# Patient Record
Sex: FEMALE | Race: WHITE | NOT HISPANIC OR LATINO | Employment: FULL TIME | URBAN - METROPOLITAN AREA
[De-identification: names, ages, dates, MRNs, and addresses within clinical notes are randomized per-mention and may not be internally consistent; named-entity substitution may affect disease eponyms.]

---
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
## 2018-07-05 NOTE — NON-PROVIDER
Vanessa Chávez is a 17 y.o. female here for a non-provider visit for PPD placement -- Step 2 of 2    Reason for PPD:  school requirement    1. TB evaluation questionnaire completed by patient? Yes      -  If any answers marked yes did you contact a provider prior to placing? Not Indicated  2.  Patient notified to return to clinic for reading on: 12/01/-12/02/2017  3.  PPD Placement documentation completed on TB evaluation questionnaire? Yes  4.  Location of TB evaluation questionnaire filed: front office folder     Statement Selected

## 2018-08-17 ENCOUNTER — NON-PROVIDER VISIT (OUTPATIENT)
Dept: OBGYN | Facility: CLINIC | Age: 18
End: 2018-08-17

## 2018-08-17 DIAGNOSIS — Z32.00 ENCOUNTER FOR PREGNANCY TEST, RESULT UNKNOWN: ICD-10-CM

## 2018-08-17 LAB
INT CON NEG: NEGATIVE
INT CON POS: POSITIVE
POC URINE PREGNANCY TEST: POSITIVE

## 2018-08-17 PROCEDURE — 81025 URINE PREGNANCY TEST: CPT | Performed by: OBSTETRICS & GYNECOLOGY

## 2018-10-03 ENCOUNTER — GYNECOLOGY VISIT (OUTPATIENT)
Dept: OBGYN | Facility: CLINIC | Age: 18
End: 2018-10-03
Payer: COMMERCIAL

## 2018-10-03 VITALS
BODY MASS INDEX: 23.62 KG/M2 | SYSTOLIC BLOOD PRESSURE: 110 MMHG | DIASTOLIC BLOOD PRESSURE: 72 MMHG | WEIGHT: 165 LBS | HEIGHT: 70 IN

## 2018-10-03 DIAGNOSIS — N93.8 DUB (DYSFUNCTIONAL UTERINE BLEEDING): ICD-10-CM

## 2018-10-03 PROCEDURE — 76805 OB US >/= 14 WKS SNGL FETUS: CPT | Performed by: OBSTETRICS & GYNECOLOGY

## 2018-10-03 PROCEDURE — 99213 OFFICE O/P EST LOW 20 MIN: CPT | Performed by: OBSTETRICS & GYNECOLOGY

## 2018-10-03 NOTE — PROGRESS NOTES
"Vanessa Chávez,  18 y.o.  female presents today with a C/O of :oligomenorrhea. Pt   No LMP recorded (lmp unknown).       Subjective : Nausea/Vomiting: No:  Abdominal /pelvic cramping : No :   vaginal bleeding:No      Menstrual Flow : moderate   GYN ROS:  normal menses, no abnormal bleeding, pelvic pain or discharge, no breast pain or new or enlarging lumps on self exam      Past Medical History:   Diagnosis Date   • Amenorrhea 2014   • Tourette's disorder 2012       No past surgical history on file.    Current Birth control:  none    OB History    Para Term  AB Living   0 0           SAB TAB Ectopic Molar Multiple Live Births                               Allergy:      Patient has no known allergies.    Exam;    /72 (BP Location: Right arm, Patient Position: Sitting)   Ht 1.778 m (5' 10\")   Wt 74.8 kg (165 lb)   LMP  (LMP Unknown)   BMI 23.68 kg/m²   well-appearing, well-hydrated, well-nourished, in no apparent distress, pleasant and verbal  normal;   PERRLA, EOMI, fundi grossly normal, no papilledema, no AV nicking, sclera clear  Clear to auscultation  RRR No M  abdomen is soft without significant tenderness, masses, organomegaly or guarding  FH 26cm   Pelvic : Deferred  Lab.    No results found for this or any previous visit (from the past 336 hour(s)).  Ultrasound :     Per my Read   Transabdominal     Second/third trimester findings: BPD: 6.83 cm, Placenta localization: anterior, Fetal presentation: transverse, US MIGDALIA: 2019 and BPD/HC/AC/FL c/w [id number]   MIGDALIA scan 2019   Positive cardiac / Fetal   Likely male       Assessment:    26w 3 day IUP   Late entry       Plan:  2 weeks for NOB       "

## 2018-10-15 ENCOUNTER — INITIAL PRENATAL (OUTPATIENT)
Dept: OBGYN | Facility: CLINIC | Age: 18
End: 2018-10-15
Payer: COMMERCIAL

## 2018-10-15 ENCOUNTER — HOSPITAL ENCOUNTER (OUTPATIENT)
Facility: MEDICAL CENTER | Age: 18
End: 2018-10-15
Attending: OBSTETRICS & GYNECOLOGY
Payer: COMMERCIAL

## 2018-10-15 ENCOUNTER — HOSPITAL ENCOUNTER (OUTPATIENT)
Dept: LAB | Facility: MEDICAL CENTER | Age: 18
End: 2018-10-15
Attending: OBSTETRICS & GYNECOLOGY
Payer: COMMERCIAL

## 2018-10-15 VITALS — BODY MASS INDEX: 24.25 KG/M2 | WEIGHT: 169 LBS | DIASTOLIC BLOOD PRESSURE: 70 MMHG | SYSTOLIC BLOOD PRESSURE: 114 MMHG

## 2018-10-15 DIAGNOSIS — Z34.03 ENCOUNTER FOR SUPERVISION OF NORMAL FIRST PREGNANCY, THIRD TRIMESTER: ICD-10-CM

## 2018-10-15 DIAGNOSIS — B37.31 VULVOVAGINAL CANDIDIASIS: ICD-10-CM

## 2018-10-15 DIAGNOSIS — Z36.3 SCREENING, ANTENATAL, FOR MALFORMATION BY ULTRASOUND: ICD-10-CM

## 2018-10-15 PROBLEM — O03.9 COMPLETE ABORTION: Status: RESOLVED | Noted: 2017-11-22 | Resolved: 2018-10-15

## 2018-10-15 LAB
ABO GROUP BLD: NORMAL
BACTERIA #/AREA URNS HPF: ABNORMAL /HPF
BLD GP AB SCN SERPL QL: NORMAL
EPI CELLS #/AREA URNS HPF: ABNORMAL /HPF
HYALINE CASTS #/AREA URNS LPF: ABNORMAL /LPF
RBC # URNS HPF: ABNORMAL /HPF
RH BLD: NORMAL
WBC #/AREA URNS HPF: ABNORMAL /HPF

## 2018-10-15 PROCEDURE — 81001 URINALYSIS AUTO W/SCOPE: CPT

## 2018-10-15 PROCEDURE — 85025 COMPLETE CBC W/AUTO DIFF WBC: CPT

## 2018-10-15 PROCEDURE — 86901 BLOOD TYPING SEROLOGIC RH(D): CPT

## 2018-10-15 PROCEDURE — 86780 TREPONEMA PALLIDUM: CPT

## 2018-10-15 PROCEDURE — 87591 N.GONORRHOEAE DNA AMP PROB: CPT | Mod: 91

## 2018-10-15 PROCEDURE — 86900 BLOOD TYPING SEROLOGIC ABO: CPT

## 2018-10-15 PROCEDURE — 87591 N.GONORRHOEAE DNA AMP PROB: CPT

## 2018-10-15 PROCEDURE — 36415 COLL VENOUS BLD VENIPUNCTURE: CPT

## 2018-10-15 PROCEDURE — 82950 GLUCOSE TEST: CPT

## 2018-10-15 PROCEDURE — 87340 HEPATITIS B SURFACE AG IA: CPT

## 2018-10-15 PROCEDURE — 87491 CHLMYD TRACH DNA AMP PROBE: CPT | Mod: 91

## 2018-10-15 PROCEDURE — 86850 RBC ANTIBODY SCREEN: CPT

## 2018-10-15 PROCEDURE — 90686 IIV4 VACC NO PRSV 0.5 ML IM: CPT | Performed by: OBSTETRICS & GYNECOLOGY

## 2018-10-15 PROCEDURE — 86762 RUBELLA ANTIBODY: CPT

## 2018-10-15 PROCEDURE — 87086 URINE CULTURE/COLONY COUNT: CPT

## 2018-10-15 PROCEDURE — 87389 HIV-1 AG W/HIV-1&-2 AB AG IA: CPT

## 2018-10-15 PROCEDURE — 90471 IMMUNIZATION ADMIN: CPT | Performed by: OBSTETRICS & GYNECOLOGY

## 2018-10-15 PROCEDURE — 87491 CHLMYD TRACH DNA AMP PROBE: CPT

## 2018-10-15 PROCEDURE — 90040 PR PRENATAL FOLLOW UP: CPT | Performed by: OBSTETRICS & GYNECOLOGY

## 2018-10-15 RX ORDER — FLUCONAZOLE 150 MG/1
150 TABLET ORAL ONCE
Qty: 1 TAB | Refills: 0 | Status: SHIPPED | OUTPATIENT
Start: 2018-10-15 | End: 2018-10-31 | Stop reason: SDUPTHER

## 2018-10-15 NOTE — LETTER
"Count Your Baby's Movements  Another step to a healthy delivery        How Many Weeks Pregnant? 28 WEEKS 1 DAY  Date to Begin Counting: 10/15/18              How to use this chart    One way for your physician to keep track of your baby's health is by knowing how often the baby moves (or \"kicks\") in your womb.  You can help your physician to do this by using this chart every day.    Every day, you should see how many hours it takes for your baby to move 10 times.  Start in the morning, as soon as you get up.    · First, write down the time your baby moves until you get to 10.  · Check off one box every time your baby moves until you get to 10.  · Write down the time you finished counting in the last column.  · Total how long it took to count up all 10 movements.  · Finally, fill in the box that shows how long this took.  After counting 10 movements, you no longer have to count any more that day.  The next morning, just start counting again as soon as you get up.    What should you call a \"movement\"?  It is hard to say, because it will feel different from one mother to another and from one pregnancy to the next.  The important thing is that you count the movements the same way throughout your pregnancy.  If you have more questions, you should ask your physician.    Count carefully every day!  SAMPLE:  Week 28    How many hours did it take to feel 10 movements?       Start  Time     1     2     3     4     5     6     7     8     9     10   Finish Time   Mon 8:20 ·  ·  ·  ·  ·  ·  ·  ·  ·  ·  11:40   Tue Wed Thu Fri               Sat               Sun                 IMPORTANT: You should contact your physician if it takes more than two hours for you to feel 10 movements.  Each morning, write down the time and start to count the movements of your baby.  Keep track by checking off one box every time you feel one movement.  When you have felt 10 \"kicks\", write down the time you " finished counting in the last column.  Then fill in the   box (over the check roni) for the number of hours it took.  Be sure to read the complete instructions on the previous page.

## 2018-10-15 NOTE — PROGRESS NOTES
Cc: New OB visit    HPI:  The patient is a 18 y.o.  28w1d based upon 26wk u/s.  No LMP recorded (lmp unknown). Patient is pregnant.    She presents for her new obstetric visit.    She reports fetal movement, denies vaginal bleeding, denies leakage of fluid, denies contractions.     She denies nausea/vomiting, headaches, or urinary symptoms.      OB History    Para Term  AB Living   2 0     1     SAB TAB Ectopic Molar Multiple Live Births   1                # Outcome Date GA Lbr Nakul/2nd Weight Sex Delivery Anes PTL Lv   2 Current            1 SAB 10/01/17     SAB           Past Medical History:   Diagnosis Date   • Amenorrhea 2014   • Tourette's disorder 2012     History reviewed. No pertinent surgical history.  Social History     Social History   • Marital status: Single     Spouse name: N/A   • Number of children: N/A   • Years of education: N/A     Occupational History   • Not on file.     Social History Main Topics   • Smoking status: Never Smoker   • Smokeless tobacco: Never Used   • Alcohol use No   • Drug use: No   • Sexual activity: Yes     Partners: Male     Birth control/ protection: Condom     Other Topics Concern   • Not on file     Social History Narrative    Lives with Mother, Mom's boyfriend, Older brother, two other younger children (not-related)    + Tob exposure    Pet - 4 dogs, 2 birds, fish, hamster    Starting as Freshman at [employer].     Family History   Problem Relation Age of Onset   • Other Mother         IPT   • Anemia Mother    • Psychiatry Brother         tourette movement d/o - resolved   • Arthritis Maternal Grandmother    • Diabetes Maternal Grandmother    • Diabetes Maternal Uncle         T2DM     Allergies:   Allergies as of 10/15/2018   • (No Known Allergies)       PE:    Blood pressure 114/70, weight 76.7 kg (169 lb), not currently breastfeeding.    General: no apparent distress, is well developed and well nourished  Head: normocephalic, atraumatic  Neck:  neck is supple, non-tender, no thyromegaly, full range of motion  CVS: regular rate and rhythm, no peripheral edema  Lungs: Normal respiratory effort. Clear to auscultation bilaterally  Abdomen: Bowel sounds positive, nondistended, soft, nontender x4, no rebound or guarding. Fundus at 28cm  Female GYN: normal female external genitalia without lesions, BUS WNL, thick cheesy white discharge noted in vaginal vault, cervix normal w/out lesions  Skin: No rashes, or ulcers or lesions seen  Psychiatric: Patient shows appropriate affect, is alert and oriented x3, intact judgment and insight.        A/P:  18 y.o.  28w1d based upon  No LMP recorded (lmp unknown). Patient is pregnant..  She is here for her new obstetric visit.    1. Encounter for supervision of normal first pregnancy, third trimester  PRENATAL PANEL 3+HIV+UACXI    GLUCOSE 1HR GESTATIONAL    CHLAMYDIA/GC PCR URINE OR SWAB   2. Screening, , for malformation by ultrasound  US-OB 2ND 3RD TRI COMPLETE       1. Ultrasound for dates and anatomy ordered.  2. Recommend prenatal classes, breast feeding classes  3.  Ordered prenatal labs & 1hr GTT.  4.  NOB packet given with ACOG prenatal book.  5.  Increase water intake and encouraged healthy nutrition.  6.  Rx of diflucan for tx of yeast infection.  7.  Continue prenatal vitamins.  8.  Follow-up in 2 weeks.   9.  Labor precautions & fetal kick counts educated.

## 2018-10-15 NOTE — NON-PROVIDER
Flu vaccine given today, Right deltoid. Screening checklist reviewed with patient. Verified by [facility]

## 2018-10-16 LAB
APPEARANCE UR: CLEAR
BASOPHILS # BLD AUTO: 0.3 % (ref 0–1.8)
BASOPHILS # BLD: 0.04 K/UL (ref 0–0.12)
BILIRUB UR QL STRIP.AUTO: NEGATIVE
C TRACH DNA SPEC QL NAA+PROBE: NEGATIVE
C TRACH DNA SPEC QL NAA+PROBE: NEGATIVE
COLOR UR: YELLOW
EOSINOPHIL # BLD AUTO: 0.08 K/UL (ref 0–0.51)
EOSINOPHIL NFR BLD: 0.6 % (ref 0–6.9)
ERYTHROCYTE [DISTWIDTH] IN BLOOD BY AUTOMATED COUNT: 42.3 FL (ref 35.9–50)
GLUCOSE 1H P 50 G GLC PO SERPL-MCNC: 88 MG/DL (ref 70–139)
GLUCOSE UR STRIP.AUTO-MCNC: NEGATIVE MG/DL
HBV SURFACE AG SER QL: NEGATIVE
HCT VFR BLD AUTO: 34.2 % (ref 37–47)
HGB BLD-MCNC: 11.3 G/DL (ref 12–16)
HIV 1+2 AB+HIV1 P24 AG SERPL QL IA: NON REACTIVE
IMM GRANULOCYTES # BLD AUTO: 0.32 K/UL (ref 0–0.11)
IMM GRANULOCYTES NFR BLD AUTO: 2.3 % (ref 0–0.9)
KETONES UR STRIP.AUTO-MCNC: NEGATIVE MG/DL
LEUKOCYTE ESTERASE UR QL STRIP.AUTO: ABNORMAL
LYMPHOCYTES # BLD AUTO: 1.86 K/UL (ref 1–4.8)
LYMPHOCYTES NFR BLD: 13.5 % (ref 22–41)
MCH RBC QN AUTO: 29.9 PG (ref 27–33)
MCHC RBC AUTO-ENTMCNC: 33 G/DL (ref 33.6–35)
MCV RBC AUTO: 90.5 FL (ref 81.4–97.8)
MICRO URNS: ABNORMAL
MONOCYTES # BLD AUTO: 0.9 K/UL (ref 0–0.85)
MONOCYTES NFR BLD AUTO: 6.5 % (ref 0–13.4)
N GONORRHOEA DNA SPEC QL NAA+PROBE: NEGATIVE
N GONORRHOEA DNA SPEC QL NAA+PROBE: NEGATIVE
NEUTROPHILS # BLD AUTO: 10.56 K/UL (ref 2–7.15)
NEUTROPHILS NFR BLD: 76.8 % (ref 44–72)
NITRITE UR QL STRIP.AUTO: NEGATIVE
NRBC # BLD AUTO: 0 K/UL
NRBC BLD-RTO: 0 /100 WBC
PH UR STRIP.AUTO: 6.5 [PH]
PLATELET # BLD AUTO: 215 K/UL (ref 164–446)
PMV BLD AUTO: 10.6 FL (ref 9–12.9)
PROT UR QL STRIP: NEGATIVE MG/DL
RBC # BLD AUTO: 3.78 M/UL (ref 4.2–5.4)
RBC UR QL AUTO: NEGATIVE
RUBV AB SER QL: 31.2 IU/ML
SP GR UR STRIP.AUTO: 1.01
SPECIMEN SOURCE: NORMAL
SPECIMEN SOURCE: NORMAL
TREPONEMA PALLIDUM IGG+IGM AB [PRESENCE] IN SERUM OR PLASMA BY IMMUNOASSAY: NON REACTIVE
UROBILINOGEN UR STRIP.AUTO-MCNC: 0.2 MG/DL
WBC # BLD AUTO: 13.8 K/UL (ref 4.8–10.8)

## 2018-10-18 LAB
BACTERIA UR CULT: NORMAL
SIGNIFICANT IND 70042: NORMAL
SITE SITE: NORMAL
SOURCE SOURCE: NORMAL

## 2018-10-25 ENCOUNTER — HOSPITAL ENCOUNTER (OUTPATIENT)
Dept: RADIOLOGY | Facility: MEDICAL CENTER | Age: 18
End: 2018-10-25
Attending: OBSTETRICS & GYNECOLOGY
Payer: COMMERCIAL

## 2018-10-25 DIAGNOSIS — Z36.3 SCREENING, ANTENATAL, FOR MALFORMATION BY ULTRASOUND: ICD-10-CM

## 2018-10-25 PROCEDURE — 76805 OB US >/= 14 WKS SNGL FETUS: CPT

## 2018-10-30 ENCOUNTER — DATING (OUTPATIENT)
Dept: OBGYN | Facility: CLINIC | Age: 18
End: 2018-10-30

## 2018-10-31 ENCOUNTER — ROUTINE PRENATAL (OUTPATIENT)
Dept: OBGYN | Facility: CLINIC | Age: 18
End: 2018-10-31
Payer: COMMERCIAL

## 2018-10-31 VITALS — DIASTOLIC BLOOD PRESSURE: 68 MMHG | SYSTOLIC BLOOD PRESSURE: 112 MMHG | WEIGHT: 170 LBS | BODY MASS INDEX: 24.39 KG/M2

## 2018-10-31 DIAGNOSIS — F32.A ANXIETY AND DEPRESSION: ICD-10-CM

## 2018-10-31 DIAGNOSIS — Z34.83 PRENATAL CARE, SUBSEQUENT PREGNANCY IN THIRD TRIMESTER: ICD-10-CM

## 2018-10-31 DIAGNOSIS — F41.9 ANXIETY AND DEPRESSION: ICD-10-CM

## 2018-10-31 DIAGNOSIS — F95.8 MOTOR TIC DISORDER: ICD-10-CM

## 2018-10-31 PROCEDURE — 90040 PR PRENATAL FOLLOW UP: CPT | Performed by: OBSTETRICS & GYNECOLOGY

## 2018-10-31 RX ORDER — FLUCONAZOLE 150 MG/1
150 TABLET ORAL ONCE
Qty: 1 TAB | Refills: 0 | Status: SHIPPED | OUTPATIENT
Start: 2018-10-31 | End: 2018-10-31

## 2018-10-31 NOTE — PROGRESS NOTES
S: Pt presents for routine OB follow up. Reports Good fetal movement/normal kick counts.  No contractions, vaginal bleeding, or leakage of fluid.    Questions answered.    O: /68   Wt 77.1 kg (170 lb)   LMP  (LMP Unknown)   BMI 24.39 kg/m²   Patients' weight gain, fluid intake and exercise level discussed.  Vitals, fundal height , fetal position, and FHR reviewed on flowsheet    Lab:No results found for this or any previous visit (from the past 336 hour(s)).    A/P:  18 y.o.  at 30w3d presents for routine obstetric follow-up.  Size equals dates  Yeast vaginitis-RX Diflucan    1.  Continue prenatal vitamins.  2.  Continue daily Fetal kick counts.  3.  Exercise at least 30 minutes daily.  4.  Increase water intake.  5.  Pregnancy and  Labor precautions educated.  6.  Follow-up in 2 weeks.  7.  GBS @ 35 wks discussed

## 2018-11-12 ENCOUNTER — ROUTINE PRENATAL (OUTPATIENT)
Dept: OBGYN | Facility: CLINIC | Age: 18
End: 2018-11-12
Payer: COMMERCIAL

## 2018-11-12 VITALS — WEIGHT: 175.2 LBS | DIASTOLIC BLOOD PRESSURE: 60 MMHG | SYSTOLIC BLOOD PRESSURE: 110 MMHG | BODY MASS INDEX: 25.14 KG/M2

## 2018-11-12 DIAGNOSIS — Z3A.32 32 WEEKS GESTATION OF PREGNANCY: ICD-10-CM

## 2018-11-12 PROCEDURE — 90471 IMMUNIZATION ADMIN: CPT | Performed by: OBSTETRICS & GYNECOLOGY

## 2018-11-12 PROCEDURE — 90040 PR PRENATAL FOLLOW UP: CPT | Performed by: OBSTETRICS & GYNECOLOGY

## 2018-11-12 PROCEDURE — 90715 TDAP VACCINE 7 YRS/> IM: CPT | Performed by: OBSTETRICS & GYNECOLOGY

## 2018-11-12 NOTE — PROGRESS NOTES
Patient here today for OB follow up @ [id number]  States good FM, denies VB and LOF.  C/O:Lump on back, pain w palpitation x2wks  Pharm verified  Good # [phone]    Tdap vaccine given today, Right deltoid. Screening checklist reviewed with pt

## 2018-11-12 NOTE — PROGRESS NOTES
EMPERATRIZ:  32w1d    Pt reports doing well.  Denies vaginal bleeding, contractions, LOF.  Reports +FM.    LMP  (LMP Unknown)   gen: AAO, NAD  FHTs: 150s  FH: 32    A/P: 18 y.o.  @ 32w1d   #Prenatal care. PNL up to date, Rh+, glucola/3rd tri CBC.  RPR WNL; s/p flu vaccine but has not had tdap - given today  # FWB.  anatomy US wnl, FH appropriate, FHT + today, continue kick counts  # Labor precautions discussed  #Delivery plan: await spontaneous labor  #Postpartum planning.  plans to BF, PPBCM discussed today - interested in Nexplanon  #RTC 2wks

## 2018-11-26 ENCOUNTER — HOSPITAL ENCOUNTER (OUTPATIENT)
Facility: MEDICAL CENTER | Age: 18
End: 2018-11-26
Attending: OBSTETRICS & GYNECOLOGY | Admitting: OBSTETRICS & GYNECOLOGY
Payer: COMMERCIAL

## 2018-11-26 VITALS
SYSTOLIC BLOOD PRESSURE: 111 MMHG | TEMPERATURE: 98.2 F | DIASTOLIC BLOOD PRESSURE: 65 MMHG | OXYGEN SATURATION: 98 % | HEART RATE: 122 BPM

## 2018-11-26 PROCEDURE — 59025 FETAL NON-STRESS TEST: CPT

## 2018-11-26 ASSESSMENT — PAIN SCALES - GENERAL: PAINLEVEL_OUTOF10: 0

## 2018-11-26 NOTE — PROGRESS NOTES
0232- Pt presented to labor unit with complaints of SOB upon waking this morning. States she couldn't breath for 20secs. Denies UC's, VB, LOF. Abd soft and non tender to touch. Confirms fetal movements. EFM and TOCO applied. FOB at bedside offering emotional support.  Pulse Ox applied. VSS, O2 sat 97%. Pt states she has a history of panic attacks. POC discussed. Pt verbalized understanding.     0240- Category one tracing noted.     0255- Dr. Cobos notified of pt status. Orders received to discharge to ER after 30 minute NST.     0300- Discharge instructions given and explained. Pt verbalized understanding.     0316- Pt discharged to emergency room with FOB undelivered.

## 2018-11-29 ENCOUNTER — ROUTINE PRENATAL (OUTPATIENT)
Dept: OBGYN | Facility: CLINIC | Age: 18
End: 2018-11-29
Payer: COMMERCIAL

## 2018-11-29 VITALS — BODY MASS INDEX: 25.54 KG/M2 | WEIGHT: 178 LBS | SYSTOLIC BLOOD PRESSURE: 110 MMHG | DIASTOLIC BLOOD PRESSURE: 62 MMHG

## 2018-11-29 DIAGNOSIS — Z34.83 ENCOUNTER FOR SUPERVISION OF OTHER NORMAL PREGNANCY, THIRD TRIMESTER: ICD-10-CM

## 2018-11-29 PROCEDURE — 90040 PR PRENATAL FOLLOW UP: CPT | Performed by: OBSTETRICS & GYNECOLOGY

## 2018-11-29 NOTE — PROGRESS NOTES
Pt here today for OB follow up @ 34w4d  Pt Denies any leaking fluids or vaginal blood  Reports +FM  Good # [phone]  Pharmacy Confirmed.

## 2018-11-29 NOTE — PROGRESS NOTES
S: Pt presents for routine OB follow up. Reports good fetal movements/normal kick counts.  No contractions, vaginal bleeding, or leakage of fluid.    Questions answered.    O: /62   Wt 80.7 kg (178 lb)   LMP  (LMP Unknown)   BMI 25.54 kg/m²   Patients' weight gain, fluid intake and exercise level discussed.  Vitals, fundal height , fetal position, and FHR reviewed on flowsheet    Lab:No results found for this or any previous visit (from the past 336 hour(s)).    A/P:  18 y.o.  at 34w4d presents for routine obstetric follow-up.  Size equals dates     1.  Continue prenatal vitamins.  2.  Continue daily Fetal kick counts.  3.  Exercise at least 30 minutes daily.  4.  Drink at least 2L of water daily  5. Pregnancy and   Labor precautions reviewed  6.  Follow-up in 1 week.  7.  GBS at f/u visit discussed

## 2018-12-10 ENCOUNTER — HOSPITAL ENCOUNTER (OUTPATIENT)
Facility: MEDICAL CENTER | Age: 18
End: 2018-12-10
Attending: OBSTETRICS & GYNECOLOGY
Payer: COMMERCIAL

## 2018-12-10 ENCOUNTER — ROUTINE PRENATAL (OUTPATIENT)
Dept: OBGYN | Facility: CLINIC | Age: 18
End: 2018-12-10
Payer: COMMERCIAL

## 2018-12-10 VITALS — WEIGHT: 179 LBS | DIASTOLIC BLOOD PRESSURE: 68 MMHG | BODY MASS INDEX: 25.68 KG/M2 | SYSTOLIC BLOOD PRESSURE: 118 MMHG

## 2018-12-10 DIAGNOSIS — Z34.83 ENCOUNTER FOR SUPERVISION OF OTHER NORMAL PREGNANCY IN THIRD TRIMESTER: ICD-10-CM

## 2018-12-10 PROBLEM — Z34.90 SUPERVISION OF NORMAL PREGNANCY: Status: ACTIVE | Noted: 2018-12-10

## 2018-12-10 PROCEDURE — 87081 CULTURE SCREEN ONLY: CPT

## 2018-12-10 PROCEDURE — 87150 DNA/RNA AMPLIFIED PROBE: CPT

## 2018-12-10 PROCEDURE — 90040 PR PRENATAL FOLLOW UP: CPT | Performed by: OBSTETRICS & GYNECOLOGY

## 2018-12-10 NOTE — PROGRESS NOTES
S: Pt presents for routine OB follow up. Reports Good fetal movements/ normal kick counts.  No contractions, vaginal bleeding, or leakage of fluid.    Questions answered.    O: /68   Wt 81.2 kg (179 lb)   LMP  (LMP Unknown)   BMI 25.68 kg/m²   Patients' weight gain, fluid intake and exercise level discussed.  Vitals, fundal height , fetal position, and FHR reviewed on flowsheet    Lab:No results found for this or any previous visit (from the past 336 hour(s)).    A/P:  18 y.o.  at 36w1d presents for routine obstetric follow-up.  Size equals dates and/or scan    1.  Continue prenatal vitamins.  2.  Fetal kick counts.  3.  Exercise at least 30 minutes daily.  4.  Drink at least 2L of water daily  5.  Pregnancy and Labor precautions reviewed  6.  Follow-up in 1 week  7.  GBS done today  8.  Precautions and plan of care reviewed

## 2018-12-10 NOTE — PROGRESS NOTES
Pt here today for OB follow up  Pt Denies leaking fluids or vaginal pain  Reports +FM  Pharmacy Confirmed.

## 2018-12-11 LAB — GP B STREP DNA SPEC QL NAA+PROBE: POSITIVE

## 2018-12-12 ENCOUNTER — TELEPHONE (OUTPATIENT)
Dept: OBGYN | Facility: CLINIC | Age: 18
End: 2018-12-12

## 2018-12-12 DIAGNOSIS — O99.820 GBS (GROUP B STREPTOCOCCUS CARRIER), +RV CULTURE, CURRENTLY PREGNANT: ICD-10-CM

## 2018-12-13 NOTE — TELEPHONE ENCOUNTER
[facility] called to report + GBS, results printed and given to provider

## 2018-12-17 ENCOUNTER — HOSPITAL ENCOUNTER (OUTPATIENT)
Facility: MEDICAL CENTER | Age: 18
End: 2018-12-17
Attending: OBSTETRICS & GYNECOLOGY | Admitting: OBSTETRICS & GYNECOLOGY
Payer: COMMERCIAL

## 2018-12-17 VITALS — TEMPERATURE: 98.1 F | SYSTOLIC BLOOD PRESSURE: 109 MMHG | HEART RATE: 100 BPM | DIASTOLIC BLOOD PRESSURE: 52 MMHG

## 2018-12-17 LAB — CRYSTALS AMN MICRO: NORMAL

## 2018-12-17 PROCEDURE — 59025 FETAL NON-STRESS TEST: CPT

## 2018-12-17 PROCEDURE — 89060 EXAM SYNOVIAL FLUID CRYSTALS: CPT

## 2018-12-18 NOTE — PROGRESS NOTES
INTERPRETATION OF NST    INDICATIONS: IUP @ 37+1/7 wks, leaking of fluid.    [place] - intermittent contractions  EFM - 130s, moderate variability, + accels.  Cat 1 tracing.       Fern negative.  No evidence of rupture per nurse.   F/U TPC as scheduled.

## 2018-12-18 NOTE — PROGRESS NOTES
MIGDALIA / EGA 37.1    Positive FM, denies VB    PT present with CO leaking that started in AM, she denies wearing a pad.  She also stated she is having menstrual like cramps.    SVE 1/thick/high, Sterile specu;um placed, no pooling noted, FERN collected

## 2018-12-18 NOTE — PROGRESS NOTES
1900- Assumed care of pt, POC discussed. Awaiting FERN results. Category One fetal heart tracing noted. Pt verbalized understanding.     1935- Dr. Condon called with negative FERN result. Category one tracing noted. Orders received to discharge home.    1936- Discharge instructions given and explained. Pt and FOB verbalized understanding.     1940- Pt discharged home undelivered.

## 2018-12-20 ENCOUNTER — ROUTINE PRENATAL (OUTPATIENT)
Dept: OBGYN | Facility: CLINIC | Age: 18
End: 2018-12-20
Payer: COMMERCIAL

## 2018-12-20 VITALS — SYSTOLIC BLOOD PRESSURE: 116 MMHG | DIASTOLIC BLOOD PRESSURE: 68 MMHG | WEIGHT: 185 LBS | BODY MASS INDEX: 26.54 KG/M2

## 2018-12-20 DIAGNOSIS — Z34.03 ENCOUNTER FOR SUPERVISION OF NORMAL FIRST PREGNANCY IN THIRD TRIMESTER: ICD-10-CM

## 2018-12-20 PROCEDURE — 90040 PR PRENATAL FOLLOW UP: CPT | Performed by: OBSTETRICS & GYNECOLOGY

## 2018-12-20 NOTE — PROGRESS NOTES
Ob follow up. Good fetal movement. Pt has no complaints.   Phone # & pharmacy verified.  GBS = Positive

## 2018-12-20 NOTE — PROGRESS NOTES
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 Currently doing well . Good fetal movement no leakage of fluid no contractions or vaginal bleeding        Size equals dates, normal fetal heart rate      Labs; GBS positive    Labor precautions given  Increase oral hydration  Discussed proper weight gain during pregnancy  Continue prenatal vitamins  Increase water intake  Reviewed our group's policy on prenatal visits with all providers in the group    Followup in 1 weeks

## 2018-12-24 ENCOUNTER — ROUTINE PRENATAL (OUTPATIENT)
Dept: OBGYN | Facility: CLINIC | Age: 18
End: 2018-12-24
Payer: COMMERCIAL

## 2018-12-24 VITALS — WEIGHT: 184 LBS | SYSTOLIC BLOOD PRESSURE: 104 MMHG | BODY MASS INDEX: 26.4 KG/M2 | DIASTOLIC BLOOD PRESSURE: 66 MMHG

## 2018-12-24 DIAGNOSIS — F41.9 ANXIETY AND DEPRESSION: ICD-10-CM

## 2018-12-24 DIAGNOSIS — Z34.83 ENCOUNTER FOR SUPERVISION OF OTHER NORMAL PREGNANCY, THIRD TRIMESTER: ICD-10-CM

## 2018-12-24 DIAGNOSIS — F95.8 MOTOR TIC DISORDER: ICD-10-CM

## 2018-12-24 DIAGNOSIS — F32.A ANXIETY AND DEPRESSION: ICD-10-CM

## 2018-12-24 DIAGNOSIS — O99.820 GBS (GROUP B STREPTOCOCCUS CARRIER), +RV CULTURE, CURRENTLY PREGNANT: ICD-10-CM

## 2018-12-24 PROCEDURE — 90040 PR PRENATAL FOLLOW UP: CPT | Performed by: OBSTETRICS & GYNECOLOGY

## 2018-12-24 NOTE — PROGRESS NOTES
S: Pt presents for routine OB follow up.  Patient reports good fetal movements/normal Daily kick counts.  No contractions, vaginal bleeding, or leakage of fluid.    Questions answered.    O: /66   Wt 83.5 kg (184 lb)   LMP  (LMP Unknown)   BMI 26.40 kg/m²   Patients' weight gain, fluid intake and exercise level discussed.  Vitals, fundal height , fetal position, and FHR reviewed on flowsheet    Lab:  Recent Results (from the past 336 hour(s))   FERN TEST    Collection Time: 18  6:40 PM   Result Value Ref Range    Fern Test On Amniotic Fluid see below Not present       A/P:  18 y.o.  at 38w1d presents for routine obstetric follow-up.  Size equals dates   Positive GBS status discussed.  Treatment in labor reviewed    1.  Continue prenatal vitamins.  2.  Fetal kick counts daily discussed.  3.  Exercise at least 30 minutes daily.  4.  Drink at least 2L of water daily  5.  Pregnancy and labor precautions were reviewed  6.  Follow-up in 1 week.

## 2018-12-31 ENCOUNTER — ROUTINE PRENATAL (OUTPATIENT)
Dept: OBGYN | Facility: CLINIC | Age: 18
End: 2018-12-31
Payer: COMMERCIAL

## 2018-12-31 VITALS — DIASTOLIC BLOOD PRESSURE: 68 MMHG | WEIGHT: 184 LBS | BODY MASS INDEX: 26.4 KG/M2 | SYSTOLIC BLOOD PRESSURE: 110 MMHG

## 2018-12-31 DIAGNOSIS — F95.8 MOTOR TIC DISORDER: ICD-10-CM

## 2018-12-31 DIAGNOSIS — Z34.83 ENCOUNTER FOR SUPERVISION OF OTHER NORMAL PREGNANCY, THIRD TRIMESTER: ICD-10-CM

## 2018-12-31 DIAGNOSIS — F41.9 ANXIETY AND DEPRESSION: ICD-10-CM

## 2018-12-31 DIAGNOSIS — F32.A ANXIETY AND DEPRESSION: ICD-10-CM

## 2018-12-31 DIAGNOSIS — O99.820 GBS (GROUP B STREPTOCOCCUS CARRIER), +RV CULTURE, CURRENTLY PREGNANT: ICD-10-CM

## 2018-12-31 PROCEDURE — 90040 PR PRENATAL FOLLOW UP: CPT | Performed by: OBSTETRICS & GYNECOLOGY

## 2018-12-31 NOTE — PROGRESS NOTES
S: Pt presents for routine OB follow up.  Normal kick counts /good fetal movement.  Reports rare mild contractions.  Denies vaginal bleeding, or leakage of fluid.    Questions answered.    O: /68   Wt 83.5 kg (184 lb)   LMP  (LMP Unknown)   BMI 26.40 kg/m²   Patients' weight gain, fluid intake and exercise level discussed.  Vitals, fundal height , fetal position, and FHR reviewed on flowsheet    Lab:  Recent Results (from the past 336 hour(s))   FERN TEST    Collection Time: 18  6:40 PM   Result Value Ref Range    Fern Test On Amniotic Fluid see below Not present       A/P:  18 y.o.  at 39w1d presents for routine obstetric follow-up.  Size equals dates   GBS positive status discussed  We discussed option for induction of labor-patient desires to wait until next visit to schedule induction    1.  Continue prenatal vitamins.  2.  Fetal kick counts.  3.  Exercise at least 30 minutes daily.  4.  Drink at least 2L of water daily  5.  Pregnancy and labor precautions educated.  6.  Follow-up in 1 week.  7.  Plan of care was discussed

## 2019-01-09 ENCOUNTER — ROUTINE PRENATAL (OUTPATIENT)
Dept: OBGYN | Facility: CLINIC | Age: 19
End: 2019-01-09
Payer: COMMERCIAL

## 2019-01-09 VITALS — WEIGHT: 194 LBS | SYSTOLIC BLOOD PRESSURE: 112 MMHG | BODY MASS INDEX: 27.84 KG/M2 | DIASTOLIC BLOOD PRESSURE: 70 MMHG

## 2019-01-09 DIAGNOSIS — F41.9 ANXIETY AND DEPRESSION: ICD-10-CM

## 2019-01-09 DIAGNOSIS — Z34.03 ENCOUNTER FOR SUPERVISION OF NORMAL FIRST PREGNANCY IN THIRD TRIMESTER: ICD-10-CM

## 2019-01-09 DIAGNOSIS — Z34.90 TERM PREGNANCY: ICD-10-CM

## 2019-01-09 DIAGNOSIS — O99.820 GBS (GROUP B STREPTOCOCCUS CARRIER), +RV CULTURE, CURRENTLY PREGNANT: ICD-10-CM

## 2019-01-09 DIAGNOSIS — F32.A ANXIETY AND DEPRESSION: ICD-10-CM

## 2019-01-09 PROCEDURE — 90040 PR PRENATAL FOLLOW UP: CPT | Performed by: OBSTETRICS & GYNECOLOGY

## 2019-01-09 NOTE — PROGRESS NOTES
EMPERATRIZ:  40w3d    Pt reports doing well.  Denies vaginal bleeding, contractions, LOF.  Reports +FM.    LMP  (LMP Unknown)   gen: AAO, NAD  SVE: /-3  FHTs: 150  FH: 38    A/P: 18 y.o.  @ 40w3d   #Prenatal care. PNL up to date, Rh+, glucola/3rd tri CBC.  RPR WNL; s/p tdap and flu vaccines. GBS +  # FWB.  anatomy US wnl, FH appropriate, FHT + today, continue kick counts  # Labor precautions discussed  #Delivery plan: induction of labor requested  #Postpartum planning.  plans to , PPBCM - aware of options  #IOL within next few days - patient to be called by

## 2019-01-12 ENCOUNTER — APPOINTMENT (OUTPATIENT)
Dept: OBGYN | Facility: MEDICAL CENTER | Age: 19
End: 2019-01-12
Attending: OBSTETRICS & GYNECOLOGY
Payer: COMMERCIAL

## 2019-01-12 ENCOUNTER — HOSPITAL ENCOUNTER (INPATIENT)
Facility: MEDICAL CENTER | Age: 19
LOS: 3 days | End: 2019-01-15
Attending: OBSTETRICS & GYNECOLOGY | Admitting: OBSTETRICS & GYNECOLOGY
Payer: COMMERCIAL

## 2019-01-12 LAB
BASOPHILS # BLD AUTO: 0.3 % (ref 0–1.8)
BASOPHILS # BLD: 0.03 K/UL (ref 0–0.12)
EOSINOPHIL # BLD AUTO: 0.06 K/UL (ref 0–0.51)
EOSINOPHIL NFR BLD: 0.7 % (ref 0–6.9)
ERYTHROCYTE [DISTWIDTH] IN BLOOD BY AUTOMATED COUNT: 39.8 FL (ref 35.9–50)
HCT VFR BLD AUTO: 34.3 % (ref 37–47)
HGB BLD-MCNC: 11.4 G/DL (ref 12–16)
HOLDING TUBE BB 8507: NORMAL
IMM GRANULOCYTES # BLD AUTO: 0.1 K/UL (ref 0–0.11)
IMM GRANULOCYTES NFR BLD AUTO: 1.1 % (ref 0–0.9)
LYMPHOCYTES # BLD AUTO: 1.39 K/UL (ref 1–4.8)
LYMPHOCYTES NFR BLD: 15.1 % (ref 22–41)
MCH RBC QN AUTO: 28.3 PG (ref 27–33)
MCHC RBC AUTO-ENTMCNC: 33.2 G/DL (ref 33.6–35)
MCV RBC AUTO: 85.1 FL (ref 81.4–97.8)
MONOCYTES # BLD AUTO: 0.73 K/UL (ref 0–0.85)
MONOCYTES NFR BLD AUTO: 7.9 % (ref 0–13.4)
NEUTROPHILS # BLD AUTO: 6.89 K/UL (ref 2–7.15)
NEUTROPHILS NFR BLD: 74.9 % (ref 44–72)
NRBC # BLD AUTO: 0 K/UL
NRBC BLD-RTO: 0 /100 WBC
PLATELET # BLD AUTO: 207 K/UL (ref 164–446)
PMV BLD AUTO: 10.7 FL (ref 9–12.9)
RBC # BLD AUTO: 4.03 M/UL (ref 4.2–5.4)
WBC # BLD AUTO: 9.2 K/UL (ref 4.8–10.8)

## 2019-01-12 PROCEDURE — 85025 COMPLETE CBC W/AUTO DIFF WBC: CPT

## 2019-01-12 PROCEDURE — 700105 HCHG RX REV CODE 258

## 2019-01-12 PROCEDURE — 770002 HCHG ROOM/CARE - OB PRIVATE (112)

## 2019-01-12 PROCEDURE — 700111 HCHG RX REV CODE 636 W/ 250 OVERRIDE (IP): Performed by: OBSTETRICS & GYNECOLOGY

## 2019-01-12 PROCEDURE — 700105 HCHG RX REV CODE 258: Performed by: OBSTETRICS & GYNECOLOGY

## 2019-01-12 PROCEDURE — 3E033VJ INTRODUCTION OF OTHER HORMONE INTO PERIPHERAL VEIN, PERCUTANEOUS APPROACH: ICD-10-PCS | Performed by: OBSTETRICS & GYNECOLOGY

## 2019-01-12 RX ORDER — SODIUM CHLORIDE, SODIUM LACTATE, POTASSIUM CHLORIDE, CALCIUM CHLORIDE 600; 310; 30; 20 MG/100ML; MG/100ML; MG/100ML; MG/100ML
INJECTION, SOLUTION INTRAVENOUS CONTINUOUS
Status: DISPENSED | OUTPATIENT
Start: 2019-01-12 | End: 2019-01-13

## 2019-01-12 RX ORDER — SODIUM CHLORIDE, SODIUM LACTATE, POTASSIUM CHLORIDE, CALCIUM CHLORIDE 600; 310; 30; 20 MG/100ML; MG/100ML; MG/100ML; MG/100ML
INJECTION, SOLUTION INTRAVENOUS
Status: COMPLETED
Start: 2019-01-12 | End: 2019-01-12

## 2019-01-12 RX ORDER — METHYLERGONOVINE MALEATE 0.2 MG/ML
0.2 INJECTION INTRAVENOUS
Status: DISCONTINUED | OUTPATIENT
Start: 2019-01-12 | End: 2019-01-13 | Stop reason: HOSPADM

## 2019-01-12 RX ORDER — IBUPROFEN 800 MG/1
800 TABLET ORAL EVERY 8 HOURS PRN
Status: DISCONTINUED | OUTPATIENT
Start: 2019-01-12 | End: 2019-01-15 | Stop reason: HOSPADM

## 2019-01-12 RX ORDER — HYDROCODONE BITARTRATE AND ACETAMINOPHEN 5; 325 MG/1; MG/1
1 TABLET ORAL EVERY 4 HOURS PRN
Status: DISCONTINUED | OUTPATIENT
Start: 2019-01-12 | End: 2019-01-15 | Stop reason: HOSPADM

## 2019-01-12 RX ORDER — MISOPROSTOL 200 UG/1
800 TABLET ORAL
Status: DISCONTINUED | OUTPATIENT
Start: 2019-01-12 | End: 2019-01-13 | Stop reason: HOSPADM

## 2019-01-12 RX ADMIN — OXYTOCIN 1 MILLI-UNITS/MIN: 10 INJECTION, SOLUTION INTRAMUSCULAR; INTRAVENOUS at 16:30

## 2019-01-12 RX ADMIN — SODIUM CHLORIDE 2.5 MILLION UNITS: 9 INJECTION, SOLUTION INTRAVENOUS at 22:03

## 2019-01-12 RX ADMIN — SODIUM CHLORIDE, POTASSIUM CHLORIDE, SODIUM LACTATE AND CALCIUM CHLORIDE 125 ML: 600; 310; 30; 20 INJECTION, SOLUTION INTRAVENOUS at 14:45

## 2019-01-12 RX ADMIN — SODIUM CHLORIDE 5 MILLION UNITS: 900 INJECTION INTRAVENOUS at 16:45

## 2019-01-12 RX ADMIN — SODIUM CHLORIDE, POTASSIUM CHLORIDE, SODIUM LACTATE AND CALCIUM CHLORIDE: 600; 310; 30; 20 INJECTION, SOLUTION INTRAVENOUS at 23:43

## 2019-01-12 NOTE — PROGRESS NOTES
History and Physical    Vanessa Chávez is a 18 y.o. female  at 40w6d who presents for induction of labor for postdates pregnancy.   Recently checked in the office 2/ 50 / -3    Subjective:   CTXs: negative   Pain: negative  LOF: negative  Vaginal bleeding: negative   Fetal movement: positive    ROS: Pertinent positives documented in HPI and all other systems reviewed & are negative    OB History    Para Term  AB Living   2 0     1     SAB TAB Ectopic Molar Multiple Live Births   1                # Outcome Date GA Lbr Nakul/2nd Weight Sex Delivery Anes PTL Lv   2 Current            1 SAB 10/01/17     SAB             PGYNHx: Hx regular periods    Past Medical History:   Diagnosis Date   • Amenorrhea 2014   • Tourette's disorder 2012     No past surgical history on file.    No current facility-administered medications for this encounter.     Allergies: Patient has no known allergies.    Social History     Social History   • Marital status: Single     Spouse name: N/A   • Number of children: N/A   • Years of education: N/A     Occupational History   • Not on file.     Social History Main Topics   • Smoking status: Never Smoker   • Smokeless tobacco: Never Used   • Alcohol use No   • Drug use: No   • Sexual activity: Yes     Partners: Male     Birth control/ protection: Condom     Other Topics Concern   • Not on file     Social History Narrative    Lives with Mother, Mom's boyfriend, Older brother, two other younger children (not-related)    + Tob exposure    Pet - 4 dogs, 2 birds, fish, hamster    Starting as Freshman at [employer].     Prenatal labs:  A+/ ab neg  GC/CT negative  GBS positive  HIV nonreactive  S/p tdap and flu vaccines  1hr 88  3tri Hg 11.3  Rubella immune  RPR non reactive  Hep B negative      Prenatal care with [facility] with following problems:  Patient Active Problem List    Diagnosis Date Noted   • Encounter for supervision of other normal pregnancy, third trimester  12/24/2018   • GBS (group B Streptococcus carrier), +RV culture, currently pregnant 12/12/2018   • Supervision of normal pregnancy 12/10/2018   • Anxiety and depression 11/14/2014   • Motor tic disorder 08/24/2012       Objective:      not currently breastfeeding.    General:   no acute distress, alert, cooperative   Skin:   no rash or abnormalities   HEENT:  PERRLA   Lungs:   CTA bilateral   Heart:   chest is clear without rales or wheezing, no pedal edema, S1, S2 normal, no murmur, regular rate and rhythm   Abdomen:   soft, gravid, NT   EFW:  3300gr   Pelvis:  adequate with gynecoid pelvis   FHT:  120 BPM  Accels yes  Decels no  Variability moderate  Category I   Uterine Size: S=D   Presentations: Cephalic   Cervix:     Dilation: 2cm    Effacement: 75%    Station:  -3    Consistency: Soft    Position: Middle     Lab Review  Lab:   Blood type: A     Recent Results (from the past 5880 hour(s))   POCT Pregnancy    Collection Time: 08/17/18  3:43 PM   Result Value Ref Range    POC Urine Pregnancy Test POSITIVE Negative    Internal Control Positive Positive     Internal Control Negative Negative    CHLAMYDIA/GC PCR URINE OR SWAB    Collection Time: 10/15/18  2:26 PM   Result Value Ref Range    Source Genital     C. trachomatis by PCR Negative Negative    N. gonorrhoeae by PCR Negative Negative   PRENATAL PANEL 3+HIV+UACXI    Collection Time: 10/15/18  5:02 PM   Result Value Ref Range    Color Yellow     Character Clear     Specific Gravity 1.010 <1.035    Ph 6.5 5.0 - 8.0    Glucose Negative Negative mg/dL    Ketones Negative Negative mg/dL    Protein Negative Negative mg/dL    Bilirubin Negative Negative    Urobilinogen, Urine 0.2 Negative    Nitrite Negative Negative    Leukocyte Esterase Large (A) Negative    Occult Blood Negative Negative    Micro Urine Req Microscopic     WBC 13.8 (H) 4.8 - 10.8 K/uL    RBC 3.78 (L) 4.20 - 5.40 M/uL    Hemoglobin 11.3 (L) 12.0 - 16.0 g/dL    Hematocrit 34.2 (L) 37.0 - 47.0 %     MCV 90.5 81.4 - 97.8 fL    MCH 29.9 27.0 - 33.0 pg    MCHC 33.0 (L) 33.6 - 35.0 g/dL    RDW 42.3 35.9 - 50.0 fL    Platelet Count 215 164 - 446 K/uL    MPV 10.6 9.0 - 12.9 fL    Neutrophils-Polys 76.80 (H) 44.00 - 72.00 %    Lymphocytes 13.50 (L) 22.00 - 41.00 %    Monocytes 6.50 0.00 - 13.40 %    Eosinophils 0.60 0.00 - 6.90 %    Basophils 0.30 0.00 - 1.80 %    Immature Granulocytes 2.30 (H) 0.00 - 0.90 %    Nucleated RBC 0.00 /100 WBC    Neutrophils (Absolute) 10.56 (H) 2.00 - 7.15 K/uL    Lymphs (Absolute) 1.86 1.00 - 4.80 K/uL    Monos (Absolute) 0.90 (H) 0.00 - 0.85 K/uL    Eos (Absolute) 0.08 0.00 - 0.51 K/uL    Baso (Absolute) 0.04 0.00 - 0.12 K/uL    Immature Granulocytes (abs) 0.32 (H) 0.00 - 0.11 K/uL    NRBC (Absolute) 0.00 K/uL    Rubella IgG Antibody 31.20 IU/mL    Syphilis, Treponemal Qual Non Reactive Non Reactive    Hepatitis B Surface Antigen Negative Negative   GLUCOSE 1HR GESTATIONAL    Collection Time: 10/15/18  5:02 PM   Result Value Ref Range    Glucose, Post Dose 88 70 - 139 mg/dL   CHLAMYDIA/GC PCR URINE OR SWAB    Collection Time: 10/15/18  5:02 PM   Result Value Ref Range    Source Urine     C. trachomatis by PCR Negative Negative    N. gonorrhoeae by PCR Negative Negative   HIV AG/AB COMBO ASSAY SCREENING    Collection Time: 10/15/18  5:02 PM   Result Value Ref Range    HIV Ag/Ab Combo Assay Non Reactive Non Reactive   OP PRENATAL PANEL-BLOOD BANK    Collection Time: 10/15/18  5:02 PM   Result Value Ref Range    ABO Grouping Only A     Rh Grouping Only POS     Antibody Screen Scrn NEG    URINE CULTURE(NEW)    Collection Time: 10/15/18  5:02 PM   Result Value Ref Range    Significant Indicator NEG     Source UR     Site      Urine Culture Mixed skin maria elena ,000 cfu/mL    URINE MICROSCOPIC (W/UA)    Collection Time: 10/15/18  5:02 PM   Result Value Ref Range    WBC 2-5 /hpf    RBC 0-2 /hpf    Bacteria Few (A) None /hpf    Epithelial Cells Few /hpf    Hyaline Cast 0-2 /lpf   GRP B STREP,  BY PCR (MONTELONGO BROTH)    Collection Time: 12/10/18  9:31 AM   Result Value Ref Range    Strep Gp B DNA PCR POSITIVE (A) Negative   FERN TEST    Collection Time: 12/17/18  6:40 PM   Result Value Ref Range    Fern Test On Amniotic Fluid see below Not present        Assessment:   Vanessa Chávez at 40w6d based on late ultrasound at 26 weeks presents for induction of labor for postdates pregnancy.  Labor status: Not in labor.  Obstetrical history significant for   Patient Active Problem List    Diagnosis Date Noted   • Encounter for supervision of other normal pregnancy, third trimester 12/24/2018   • GBS (group B Streptococcus carrier), +RV culture, currently pregnant 12/12/2018   • Supervision of normal pregnancy 12/10/2018   • Anxiety and depression 11/14/2014   • Motor tic disorder 08/24/2012   .      Plan:     Admit to L&D  GBS positive - PCN in active labor  Fetal monitoring/toco  Epidural prn  Pitocin induction 2 x 2 q 30  PP prophylaxis: thinking about Nexplanon  Plans on breast feeding

## 2019-01-13 LAB
ERYTHROCYTE [DISTWIDTH] IN BLOOD BY AUTOMATED COUNT: 40.2 FL (ref 35.9–50)
HCT VFR BLD AUTO: 32.1 % (ref 37–47)
HGB BLD-MCNC: 10.7 G/DL (ref 12–16)
MCH RBC QN AUTO: 28.7 PG (ref 27–33)
MCHC RBC AUTO-ENTMCNC: 33.3 G/DL (ref 33.6–35)
MCV RBC AUTO: 86.1 FL (ref 81.4–97.8)
PLATELET # BLD AUTO: 173 K/UL (ref 164–446)
PMV BLD AUTO: 10.2 FL (ref 9–12.9)
RBC # BLD AUTO: 3.73 M/UL (ref 4.2–5.4)
WBC # BLD AUTO: 16.7 K/UL (ref 4.8–10.8)

## 2019-01-13 PROCEDURE — 304965 HCHG RECOVERY SERVICES

## 2019-01-13 PROCEDURE — 700102 HCHG RX REV CODE 250 W/ 637 OVERRIDE(OP): Performed by: OBSTETRICS & GYNECOLOGY

## 2019-01-13 PROCEDURE — A9270 NON-COVERED ITEM OR SERVICE: HCPCS | Performed by: OBSTETRICS & GYNECOLOGY

## 2019-01-13 PROCEDURE — 36415 COLL VENOUS BLD VENIPUNCTURE: CPT

## 2019-01-13 PROCEDURE — 700101 HCHG RX REV CODE 250

## 2019-01-13 PROCEDURE — 700111 HCHG RX REV CODE 636 W/ 250 OVERRIDE (IP): Performed by: OBSTETRICS & GYNECOLOGY

## 2019-01-13 PROCEDURE — 700111 HCHG RX REV CODE 636 W/ 250 OVERRIDE (IP)

## 2019-01-13 PROCEDURE — 59200 INSERT CERVICAL DILATOR: CPT

## 2019-01-13 PROCEDURE — 770002 HCHG ROOM/CARE - OB PRIVATE (112)

## 2019-01-13 PROCEDURE — 59400 OBSTETRICAL CARE: CPT | Performed by: OBSTETRICS & GYNECOLOGY

## 2019-01-13 PROCEDURE — 85027 COMPLETE CBC AUTOMATED: CPT

## 2019-01-13 PROCEDURE — 59409 OBSTETRICAL CARE: CPT

## 2019-01-13 PROCEDURE — 0UQMXZZ REPAIR VULVA, EXTERNAL APPROACH: ICD-10-PCS | Performed by: OBSTETRICS & GYNECOLOGY

## 2019-01-13 PROCEDURE — 700105 HCHG RX REV CODE 258: Performed by: OBSTETRICS & GYNECOLOGY

## 2019-01-13 PROCEDURE — 303615 HCHG EPIDURAL/SPINAL ANESTHESIA FOR LABOR

## 2019-01-13 RX ORDER — SODIUM CHLORIDE, SODIUM LACTATE, POTASSIUM CHLORIDE, CALCIUM CHLORIDE 600; 310; 30; 20 MG/100ML; MG/100ML; MG/100ML; MG/100ML
INJECTION, SOLUTION INTRAVENOUS PRN
Status: DISCONTINUED | OUTPATIENT
Start: 2019-01-13 | End: 2019-01-15 | Stop reason: HOSPADM

## 2019-01-13 RX ORDER — SODIUM CHLORIDE, SODIUM LACTATE, POTASSIUM CHLORIDE, AND CALCIUM CHLORIDE .6; .31; .03; .02 G/100ML; G/100ML; G/100ML; G/100ML
250 INJECTION, SOLUTION INTRAVENOUS PRN
Status: DISCONTINUED | OUTPATIENT
Start: 2019-01-13 | End: 2019-01-13 | Stop reason: HOSPADM

## 2019-01-13 RX ORDER — DOCUSATE SODIUM 100 MG/1
100 CAPSULE, LIQUID FILLED ORAL 2 TIMES DAILY PRN
Status: DISCONTINUED | OUTPATIENT
Start: 2019-01-13 | End: 2019-01-15 | Stop reason: HOSPADM

## 2019-01-13 RX ORDER — HYDROCODONE BITARTRATE AND ACETAMINOPHEN 10; 325 MG/1; MG/1
1 TABLET ORAL EVERY 4 HOURS PRN
Status: DISCONTINUED | OUTPATIENT
Start: 2019-01-13 | End: 2019-01-15 | Stop reason: HOSPADM

## 2019-01-13 RX ORDER — ROPIVACAINE HYDROCHLORIDE 2 MG/ML
INJECTION, SOLUTION EPIDURAL; INFILTRATION; PERINEURAL
Status: COMPLETED
Start: 2019-01-13 | End: 2019-01-13

## 2019-01-13 RX ORDER — SODIUM CHLORIDE, SODIUM LACTATE, POTASSIUM CHLORIDE, AND CALCIUM CHLORIDE .6; .31; .03; .02 G/100ML; G/100ML; G/100ML; G/100ML
1000 INJECTION, SOLUTION INTRAVENOUS
Status: DISCONTINUED | OUTPATIENT
Start: 2019-01-13 | End: 2019-01-13 | Stop reason: HOSPADM

## 2019-01-13 RX ORDER — SODIUM CHLORIDE, SODIUM LACTATE, POTASSIUM CHLORIDE, CALCIUM CHLORIDE 600; 310; 30; 20 MG/100ML; MG/100ML; MG/100ML; MG/100ML
INJECTION, SOLUTION INTRAVENOUS CONTINUOUS
Status: DISCONTINUED | OUTPATIENT
Start: 2019-01-13 | End: 2019-01-15 | Stop reason: HOSPADM

## 2019-01-13 RX ORDER — HYDROCODONE BITARTRATE AND ACETAMINOPHEN 5; 325 MG/1; MG/1
1 TABLET ORAL EVERY 4 HOURS PRN
Status: DISCONTINUED | OUTPATIENT
Start: 2019-01-13 | End: 2019-01-15 | Stop reason: HOSPADM

## 2019-01-13 RX ORDER — VITAMIN A ACETATE, BETA CAROTENE, ASCORBIC ACID, CHOLECALCIFEROL, .ALPHA.-TOCOPHEROL ACETATE, DL-, THIAMINE MONONITRATE, RIBOFLAVIN, NIACINAMIDE, PYRIDOXINE HYDROCHLORIDE, FOLIC ACID, CYANOCOBALAMIN, CALCIUM CARBONATE, FERROUS FUMARATE, ZINC OXIDE, CUPRIC OXIDE 3080; 12; 120; 400; 1; 1.84; 3; 20; 22; 920; 25; 200; 27; 10; 2 [IU]/1; UG/1; MG/1; [IU]/1; MG/1; MG/1; MG/1; MG/1; MG/1; [IU]/1; MG/1; MG/1; MG/1; MG/1; MG/1
1 TABLET, FILM COATED ORAL EVERY MORNING
Status: DISCONTINUED | OUTPATIENT
Start: 2019-01-13 | End: 2019-01-15 | Stop reason: HOSPADM

## 2019-01-13 RX ORDER — LIDOCAINE HYDROCHLORIDE 10 MG/ML
INJECTION, SOLUTION INFILTRATION; PERINEURAL
Status: COMPLETED
Start: 2019-01-13 | End: 2019-01-13

## 2019-01-13 RX ORDER — MISOPROSTOL 200 UG/1
600 TABLET ORAL
Status: DISCONTINUED | OUTPATIENT
Start: 2019-01-13 | End: 2019-01-15 | Stop reason: HOSPADM

## 2019-01-13 RX ORDER — ROPIVACAINE HYDROCHLORIDE 2 MG/ML
INJECTION, SOLUTION EPIDURAL; INFILTRATION; PERINEURAL CONTINUOUS
Status: DISCONTINUED | OUTPATIENT
Start: 2019-01-13 | End: 2019-01-15 | Stop reason: HOSPADM

## 2019-01-13 RX ADMIN — HYDROCODONE BITARTRATE AND ACETAMINOPHEN 1 TABLET: 5; 325 TABLET ORAL at 13:14

## 2019-01-13 RX ADMIN — SODIUM CHLORIDE 2.5 MILLION UNITS: 9 INJECTION, SOLUTION INTRAVENOUS at 01:57

## 2019-01-13 RX ADMIN — IBUPROFEN 800 MG: 800 TABLET, FILM COATED ORAL at 13:13

## 2019-01-13 RX ADMIN — FENTANYL CITRATE 100 MCG: 50 INJECTION, SOLUTION INTRAMUSCULAR; INTRAVENOUS at 03:39

## 2019-01-13 RX ADMIN — ROPIVACAINE HYDROCHLORIDE 100 ML: 2 INJECTION, SOLUTION EPIDURAL; INFILTRATION; PERINEURAL at 05:03

## 2019-01-13 RX ADMIN — LIDOCAINE HYDROCHLORIDE: 10 INJECTION, SOLUTION INFILTRATION; PERINEURAL at 12:15

## 2019-01-13 RX ADMIN — ROPIVACAINE HYDROCHLORIDE 100 ML: 2 INJECTION, SOLUTION EPIDURAL; INFILTRATION at 05:03

## 2019-01-13 RX ADMIN — SODIUM CHLORIDE 2.5 MILLION UNITS: 9 INJECTION, SOLUTION INTRAVENOUS at 06:52

## 2019-01-13 RX ADMIN — SODIUM CHLORIDE, POTASSIUM CHLORIDE, SODIUM LACTATE AND CALCIUM CHLORIDE: 600; 310; 30; 20 INJECTION, SOLUTION INTRAVENOUS at 06:00

## 2019-01-13 RX ADMIN — SODIUM CHLORIDE, POTASSIUM CHLORIDE, SODIUM LACTATE AND CALCIUM CHLORIDE: 600; 310; 30; 20 INJECTION, SOLUTION INTRAVENOUS at 04:45

## 2019-01-13 RX ADMIN — Medication 125 ML/HR: at 13:13

## 2019-01-13 ASSESSMENT — PAIN SCALES - GENERAL: PAINLEVEL_OUTOF10: 0

## 2019-01-13 NOTE — PROGRESS NOTES
1400  Pt here for scheduled induction for post dates. Pt angry about being stalled for induction. Rationale explanied. Pt external monitorsd placed, VS taken and WNL. FOB and Pts mom at bedside.   1550 IV started 18 anny times 2 attempts after pt jumped. Scheduled  meds given.   1630 Piutocin begun per MD order.

## 2019-01-13 NOTE — PROGRESS NOTES
0645 Report rcvd from MILLER June, at bedside. POC discussed, pt care assumed. Pt found to be sleeping on her right side in no discomfort. Pitocin had been stopped before the epidural was placed. Pitocin restarted at this time.

## 2019-01-13 NOTE — CARE PLAN
Problem: Pain  Goal: Alleviation of Pain or a reduction in pain to the patient's comfort goal  Outcome: PROGRESSING AS EXPECTED  Pain POC discussed, p denies pain at this time, pt desires epidural when more uncomfortable.    Problem: Risk for Infection, Impaired Wound Healing  Goal: Remain free from signs and symptoms of infection  Outcome: PROGRESSING AS EXPECTED  Pt afebrile, no s/s of infection

## 2019-01-13 NOTE — PROGRESS NOTES
2300: report received from sonia seth, assumed care of patient.    0300: pt request epidural, dr. Bruce in the or, pt given fentanyl iv.    0455: dr. Bruce at , epidural placed. No complications.     0700: report to anna seth

## 2019-01-13 NOTE — L&D DELIVERY NOTE
Vaginal Delivery Procedure Note:    Vanessa Chávez is a 18 y.o. , now Para 1011    Weeks of gestation: 41.0 weeks  Diagnosis: Post dates, Induction of labor, GBS positive     of viable male infant occiput anterior position over intact perineum at 1204.  APGARs 8 and 9  Birth weight 3950gm (8lb 11oz)  Nuchal cord present x 1, loose, reduced at delivery of infant's head.  Baby placed on mom's abdomen, delayed cord clamp performed  Placenta delivered spontaneously intact at 1215. 3 Vessel cord.  Laceration: bilateral periurethral 1st degree lacerations repaired with 4-0 vicryl suture interrupted, 1st degree right vaginal sulcus laceration repaired with 3-0 vicryl suture running.  Excellent hemostasis.  EBL 200mL  Anesthesia - epidural & 1% lidocaine without epinephrine for repairs  Sponge and needle counts correct.  Patient tolerated procedure well.

## 2019-01-13 NOTE — PROGRESS NOTES
Labor Check    S: Pt examined. Feeling cramping    O:  Gen: AAO in NAD  Abd: gravid, nontender to palpation  SVE: 2-3 / 70 / -3  Ext: nontender bl, nonedematous    FHT: 130 / mod eddy / reactive  [place]: q10min    A/P:  18 y.o.  at 40w6d here for IOL for postdates  - epidural prn  - pitocin currently at 18mu.   - increase per protocol

## 2019-01-13 NOTE — PROGRESS NOTES
1900-Report received from DIEGO Hobson RN. Pt resting comfortably in bed. POC discussed, questions answered.

## 2019-01-14 PROCEDURE — 700112 HCHG RX REV CODE 229: Performed by: OBSTETRICS & GYNECOLOGY

## 2019-01-14 PROCEDURE — 700102 HCHG RX REV CODE 250 W/ 637 OVERRIDE(OP): Performed by: OBSTETRICS & GYNECOLOGY

## 2019-01-14 PROCEDURE — A9270 NON-COVERED ITEM OR SERVICE: HCPCS | Performed by: OBSTETRICS & GYNECOLOGY

## 2019-01-14 PROCEDURE — 770002 HCHG ROOM/CARE - OB PRIVATE (112)

## 2019-01-14 RX ADMIN — IBUPROFEN 800 MG: 800 TABLET, FILM COATED ORAL at 21:04

## 2019-01-14 RX ADMIN — DOCUSATE SODIUM 100 MG: 100 CAPSULE, LIQUID FILLED ORAL at 21:04

## 2019-01-14 RX ADMIN — Medication 1 TABLET: at 07:58

## 2019-01-14 RX ADMIN — IBUPROFEN 800 MG: 800 TABLET, FILM COATED ORAL at 07:57

## 2019-01-14 ASSESSMENT — EDINBURGH POSTNATAL DEPRESSION SCALE (EPDS)
THINGS HAVE BEEN GETTING ON TOP OF ME: NO, I HAVE BEEN COPING AS WELL AS EVER
I HAVE FELT SCARED OR PANICKY FOR NO GOOD REASON: YES, QUITE A LOT
I HAVE BLAMED MYSELF UNNECESSARILY WHEN THINGS WENT WRONG: NOT VERY OFTEN
I HAVE LOOKED FORWARD WITH ENJOYMENT TO THINGS: AS MUCH AS I EVER DID
I HAVE BEEN ABLE TO LAUGH AND SEE THE FUNNY SIDE OF THINGS: AS MUCH AS I ALWAYS COULD
I HAVE FELT SAD OR MISERABLE: NO, NOT AT ALL
I HAVE BEEN ANXIOUS OR WORRIED FOR NO GOOD REASON: YES, SOMETIMES
I HAVE BEEN SO UNHAPPY THAT I HAVE BEEN CRYING: NO, NEVER
I HAVE BEEN SO UNHAPPY THAT I HAVE HAD DIFFICULTY SLEEPING: NOT AT ALL
THE THOUGHT OF HARMING MYSELF HAS OCCURRED TO ME: NEVER

## 2019-01-14 ASSESSMENT — PAIN SCALES - GENERAL
PAINLEVEL_OUTOF10: 0
PAINLEVEL_OUTOF10: 3
PAINLEVEL_OUTOF10: 4
PAINLEVEL_OUTOF10: 0
PAINLEVEL_OUTOF10: 0

## 2019-01-14 ASSESSMENT — LIFESTYLE VARIABLES: DO YOU DRINK ALCOHOL: NO

## 2019-01-14 NOTE — PROGRESS NOTES
1355--Report received from Joey BHATT. Lactation consultant in with mom and baby.     1415--Per Anita BHATT, lactation consultant infant looked jittery. DS: 60.

## 2019-01-14 NOTE — PROGRESS NOTES
Assumed care of patient, report at bedside from, Cristiane BHATT. Assessment completed and WDL. Call light within reach, discussed plan of care, denies pain at the moment. Will continue to monitor.

## 2019-01-14 NOTE — PROGRESS NOTES
Patient brought from labor and delivery via wheelchair.  Patient oriented to room and surroundings. Id bands and security issues discussed. Including not letting anyone take infant without pink photo id. No sleeping with infant, no carrying infant in halls, and no leaving infant unattended. 0-10 pain scale and pain management discussed. Fundus firm with light lochia. IV infusing without redness or swelling.  Family at bedside.  Patient encouraged to call before getting out of bed until stable.  Patient encouraged to call for any needs.ID bands checked and cuddles activated

## 2019-01-14 NOTE — PROGRESS NOTES
Assessment done vital signs stable. Patient progressing according to plan of care. Fundus firm at the umbilicus with light lochia. Patient up voiding without difficulty. Ambulating with steady gait. Claims to have good pain relief with p.o medications. Breast feeding infant on demand. Family at bedside. Patient denies problems at this time. Patient encouraged to call for any needs.Pt claims she will call for medications as needed

## 2019-01-14 NOTE — PROGRESS NOTES
Post Partum Progress Note    Name:   Vanessa Chávez   Date/Time:  1/14/2019 - 6:33 AM  Chief Admitting Dx:  Pregnancy  Encounter for induction of labor  Delivery Type:  vaginal, spontaneous  Post-Op/Post Partum Days #:  1    Subjective:  Abdominal pain: no  Ambulating:   yes  Tolerating liquids:  yes  Tolerating food:  yes common adult  Flatus:   no  BM:    no  Bleeding:   with a moderate amount of bleeding  Voiding:   yes  Dizziness:   no  Feeding:   breast    Vitals:    01/13/19 1304 01/13/19 1600 01/13/19 2000 01/14/19 0000   BP:  110/67 112/76 113/72   Pulse:  97 79 82   Resp:  18 18 16   Temp: 37.7 °C (99.8 °F) 37.1 °C (98.8 °F) 36.5 °C (97.7 °F) 36.6 °C (97.8 °F)   TempSrc: Temporal Oral Oral Oral   SpO2:  95% 95% 95%   Weight:       Height:           Exam:  Breast: Tenderness no  Abdomen: Abdomen soft, non-tender. BS normal. No masses,  No organomegaly  Fundal Tenderness:  no  Fundus Firm: yes  Incision: none  Below umbilicus: yes  Perineum: perineum first degree  Lochia: moderate  Extremities: Normal extremities, peripheral pulses and reflexes normal, no edema, redness or tenderness in the calves or thighs    Meds:  Current Facility-Administered Medications   Medication Dose   • fentaNYL (SUBLIMAZE) injection 100 mcg  100 mcg   • ropivacaine (NAROPIN) injection     • lactated ringers infusion     • oxytocin (PITOCIN) infusion (for postpartum)  2,000 mL/hr    Followed by   • oxytocin (PITOCIN) infusion (for postpartum)   mL/hr   • HYDROcodone-acetaminophen (NORCO) 5-325 MG per tablet 1 Tab  1 Tab   • HYDROcodone/acetaminophen (NORCO)  MG per tablet 1 Tab  1 Tab   • LR infusion     • PRN oxytocin (PITOCIN) (20 Units/1000 mL) PRN for excessive uterine bleeding - See Admin Instr  125-999 mL/hr   • miSOPROStol (CYTOTEC) tablet 600 mcg  600 mcg   • docusate sodium (COLACE) capsule 100 mg  100 mg   • magnesium hydroxide (MILK OF MAGNESIA) suspension 30 mL  30 mL   • prenatal plus vitamin  (STUARTNATAL 1+1) 27-1 MG tablet 1 Tab  1 Tab   • oxytocin (PITOCIN) infusion (for induction)  0.5-20 maciel-units/min   • ibuprofen (MOTRIN) tablet 800 mg  800 mg   • HYDROcodone-acetaminophen (NORCO) 5-325 MG per tablet 1 Tab  1 Tab       Labs:   Recent Labs      01/12/19   1510  01/13/19   2058   WBC  9.2  16.7*   RBC  4.03*  3.73*   HEMOGLOBIN  11.4*  10.7*   HEMATOCRIT  34.3*  32.1*   MCV  85.1  86.1   MCH  28.3  28.7   MCHC  33.2*  33.3*   RDW  39.8  40.2   PLATELETCT  207  173   MPV  10.7  10.2       Assessment:  Chief Admitting Dx:  Pregnancy  Encounter for induction of labor  Delivery Type:  vaginal, spontaneous  Tubal Ligation:  no    Plan:  Continue routine post partum care.  Ambulation  GBS positive     ORESTES Maxwell

## 2019-01-15 VITALS
OXYGEN SATURATION: 98 % | WEIGHT: 196 LBS | HEIGHT: 69 IN | DIASTOLIC BLOOD PRESSURE: 73 MMHG | HEART RATE: 55 BPM | BODY MASS INDEX: 29.03 KG/M2 | SYSTOLIC BLOOD PRESSURE: 113 MMHG | TEMPERATURE: 98 F | RESPIRATION RATE: 17 BRPM

## 2019-01-15 PROBLEM — O99.820 GBS (GROUP B STREPTOCOCCUS CARRIER), +RV CULTURE, CURRENTLY PREGNANT: Status: RESOLVED | Noted: 2018-12-12 | Resolved: 2019-01-15

## 2019-01-15 PROBLEM — Z34.83 ENCOUNTER FOR SUPERVISION OF OTHER NORMAL PREGNANCY, THIRD TRIMESTER: Status: RESOLVED | Noted: 2018-12-24 | Resolved: 2019-01-15

## 2019-01-15 PROBLEM — Z34.90 SUPERVISION OF NORMAL PREGNANCY: Status: RESOLVED | Noted: 2018-12-10 | Resolved: 2019-01-15

## 2019-01-15 PROCEDURE — 700112 HCHG RX REV CODE 229: Performed by: OBSTETRICS & GYNECOLOGY

## 2019-01-15 PROCEDURE — A9270 NON-COVERED ITEM OR SERVICE: HCPCS | Performed by: OBSTETRICS & GYNECOLOGY

## 2019-01-15 PROCEDURE — 700102 HCHG RX REV CODE 250 W/ 637 OVERRIDE(OP): Performed by: OBSTETRICS & GYNECOLOGY

## 2019-01-15 RX ORDER — IBUPROFEN 800 MG/1
800 TABLET ORAL EVERY 8 HOURS PRN
Qty: 30 TAB | Refills: 2 | Status: SHIPPED | OUTPATIENT
Start: 2019-01-15 | End: 2019-11-20

## 2019-01-15 RX ADMIN — DOCUSATE SODIUM 100 MG: 100 CAPSULE, LIQUID FILLED ORAL at 13:14

## 2019-01-15 RX ADMIN — IBUPROFEN 800 MG: 800 TABLET, FILM COATED ORAL at 05:02

## 2019-01-15 RX ADMIN — Medication 1 TABLET: at 05:02

## 2019-01-15 RX ADMIN — IBUPROFEN 800 MG: 800 TABLET, FILM COATED ORAL at 13:14

## 2019-01-15 ASSESSMENT — PAIN SCALES - GENERAL
PAINLEVEL_OUTOF10: 3
PAINLEVEL_OUTOF10: 2
PAINLEVEL_OUTOF10: 0
PAINLEVEL_OUTOF10: 4
PAINLEVEL_OUTOF10: 2
PAINLEVEL_OUTOF10: 0

## 2019-01-15 NOTE — DISCHARGE PLANNING
Discharge Planning Assessment Post Partum     Reason for Referral: History of anxiety and depression.  Address: [address] [place] [address] [place] [address]  Phone: [phone]  Type of Living Situation: living with FOB  Mom Diagnosis: Pregnancy  Baby Diagnosis: Wedron  Primary Language: English     Name of Baby: Rad Ohara (: 17)  Father of the Baby: Jones Ohara  Involved in baby’s care? Yes  Contact Information: [phone]     Prenatal Care: Yes  Mom's PCP: None  PCP for new baby: [facility]     Support System: FOB  Coping/Bonding between mother & baby: Yes  Source of Feeding: breast and bottle  Supplies for Infant: prepared for infant; denies any needs     Mom's Insurance: [employer]  Baby Covered on Insurance: Yes  Mother Employed/School: [employer]   Other children in the home/names & ages: 1st baby     Financial Hardship/Income: denies   Mom's Mental status: alert and oriented  Services used prior to admit: None     CPS History: No  Psychiatric History: anxiety and depression.  Provided MOB with a counseling resource and discussed post partum depression.  Domestic Violence History: denies  Drug/ETOH History: denies     Resources Provided: children and family resource list, counseling resource for post partum depression  Referrals Made: diaper bank referral      Clearance for Discharge: Infant is cleared to discharge home with parents.

## 2019-01-15 NOTE — DISCHARGE INSTRUCTIONS
POSTPARTUM DISCHARGE INSTRUCTIONS FOR MOM    YOB: 2000   Age: 18 y.o.               Admit Date: 2019     Discharge Date: 1/15/2019  Attending Doctor:  Mary Antonio D.O.                  Allergies:  Patient has no known allergies.    Discharged to home by car. Discharged via wheelchair, hospital escort: Yes.  Special equipment needed: Not Applicable  Belongings with: Personal  Be sure to schedule a follow-up appointment with your primary care doctor or any specialists as instructed.     Discharge Plan:   Diet Plan: Discussed  Activity Level: Discussed  Confirmed Follow up Appointment: Patient to Call and Schedule Appointment  Confirmed Symptoms Management: Discussed  Medication Reconciliation Updated: Yes  Influenza Vaccine Indication: Not indicated: Previously immunized this influenza season and > 8 years of age    REASONS TO CALL YOUR OBSTETRICIAN:  1.   Persistent fever or shaking chills (Temperature higher than 100.4)  2.   Heavy bleeding (soaking more than 1 pad per hour); Passing clots  3.   Foul odor from vagina  4.   Mastitis (Breast infection; breast pain, chills, fever, redness)  5.   Urinary pain, burning or frequency  6.   Episiotomy infection  7.   Abdominal incision infection  8.   Severe depression longer than 24 hours    HAND WASHING  · Prior to handling the baby.  · Before breastfeeding or bottle feeding baby.  · After using the bathroom or changing the baby's diaper.    WOUND CARE  Ask your physician for additional care instructions.  In general:    ·  Incision:      · Keep clean and dry.    · Do NOT lift anything heavier than your baby for up to 6 weeks.    · There should not be any opening or pus.      VAGINAL CARE  · Nothing inside vagina for 6 weeks: no sexual intercourse, tampons or douching.  · Bleeding may continue for 2-4 weeks.  Amount may vary.    · Call your physician for heavy bleeding which means soaking more than 1 pad per hour    BIRTH CONTROL  · It is  "possible to become pregnant at any time after delivery and while breastfeeding.  · Plan to discuss a method of birth control with your physician at your follow up visit. visit.    DIET AND ELIMINATION  · Eating more fiber (bran cereal, fruits, and vegetables) and drinking plenty of fluids will help to avoid constipation.  · Urinary frequency after childbirth is normal.    POSTPARTUM BLUES  During the first few days after birth, you may experience a sense of the \"blues\" which may include impatience, irritability or even crying.  These feeling come and go quickly.  However, as many as 1 in 10 women experience emotional symptoms known as postpartum depression.    Postpartum depression:  May start as early as the second or third day after delivery or take several weeks or months to develop.  Symptoms of \"blues\" are present, but are more intense:  Crying spells; loss of appetite; feelings of hopelessness or loss of control; fear of touching the baby; over concern or no concern at all about the baby; little or no concern about your own appearance/caring for yourself; and/or inability to sleep or excessive sleeping.  Contact your physician if you are experiencing any of these symptoms.    Crisis Hotline:  · [place] Crisis Hotline:  7-830-QHITZAX  Or 1-560.113.1572  · [place] Crisis Hotline:  1-530.329.5544  Or 458-615-4271    PREVENTING SHAKEN BABY:  If you are angry or stressed, PUT THE BABY IN THE CRIB, step away, take some deep breaths, and wait until you are calm to care for the baby.  DO NOT SHAKE THE BABY.  You are not alone, call a supporter for help.    · Crisis Call Center 24/7 crisis line 894-448-9020 or 1-216.660.8435  · You can also text them, text \"ANSWER\" to 256071    QUIT SMOKING/TOBACCO USE:  I understand the use of any tobacco products increases my chance of suffering from future heart disease and could cause other illnesses which may shorten my life. Quitting the use of tobacco products is the single most " important thing I can do to improve my health. For further information on smoking / tobacco cessation call a Toll Free Quit Line at 1-555.957.9994 (*National Cancer [place]) or 1-646.224.5477 (American Lung Association) or you can access the web based program at www.lungusa.org.    · [place] Tobacco Users Help Line:  (793) 926-5986       Toll Free: 1-182.656.8837  · Quit Tobacco Program [facility] (624)863-9871    DEPRESSION / SUICIDE RISK:  As you are discharged from this [facility], it is important to learn how to keep safe from harming yourself.    Recognize the warning signs:  · Abrupt changes in personality, positive or negative- including increase in energy   · Giving away possessions  · Change in eating patterns- significant weight changes-  positive or negative  · Change in sleeping patterns- unable to sleep or sleeping all the time   · Unwillingness or inability to communicate  · Depression  · Unusual sadness, discouragement and loneliness  · Talk of wanting to die  · Neglect of personal appearance   · Rebelliousness- reckless behavior  · Withdrawal from people/activities they love  · Confusion- inability to concentrate     If you or a loved one observes any of these behaviors or has concerns about self-harm, here's what you can do:  · Talk about it- your feelings and reasons for harming yourself  · Remove any means that you might use to hurt yourself (examples: pills, rope, extension cords, firearm)  · Get professional help from the community (Mental Health, Substance Abuse, psychological counseling)  · Do not be alone:Call your Safe Contact- someone whom you trust who will be there for you.  · Call your local CRISIS HOTLINE 838-5039 or 951-603-7354  · Call your local Children's Mobile Crisis Response Team [place] (907) 799-1012 or www.Kamicat  · Call the toll free National Suicide Prevention Hotlines   · National Suicide Prevention Lifeline 331-931-GIUY  (8537)  · [facility] 800-SUICIDE (420-7740)    DISCHARGE SURVEY:  Thank you for choosing [facility].  We hope we provided you with very good care.  You may be receiving a survey in the mail.  Please fill it out.  Your opinion is valuable to us.    ADDITIONAL EDUCATIONAL MATERIALS GIVEN TO PATIENT:        My signature on this form indicates that:  1.  I have reviewed and understand the above information  2.  My questions regarding this information have been answered to my satisfaction.  3.  I have formulated a plan with my discharge nurse to obtain my prescribed medication for home.

## 2019-01-15 NOTE — LACTATION NOTE
This note was copied from a baby's chart.  Infant sleepy and not latching, best feeding per mother's report lasted approximately 5 minutes of active sucking before falling asleep, has not latched to breast since early this morning, mother has done some spoon feeding of expressed colostrum. Initiated pumping and supplementation plan for inconsistent and sub-optimal feeds at breast. Reviewed pump use and settings, 25% suction to comfort, 25mm flanges. Lactation to follow tonight.

## 2019-01-15 NOTE — LACTATION NOTE
Baby asleep in [place] post circumcision.  Encouraged MOB to do skin to skin with baby frequently while she is awake to facilitate latching. Reviewed New Beginnings booklet and discussed what to expect over next 3 days. Offered assistance with skin to skin and latching, MOB declines at this time. Encouraged to call for latch assessment.

## 2019-01-15 NOTE — LACTATION NOTE
Follow-up visit. MOB had concerns regarding clusterfeeding. Reviewed normal  behaviors and normal course of breastfeeding at 12-24-48-72 hours, and what to expect.    At this time, MOB didn't need any assist with feeding, she states she will call when needs latch assistance.    Information and phone number to Lactation connection, and invited to breastfeeding circles.

## 2019-01-15 NOTE — LACTATION NOTE
followup with MOB who reports she started pumping yesterday over concerns about milk production.  She gave some formula through the night because she did not see milk in the pump. We discussed normal feeding frequency and how the breast makes milk. Encouraged to feed when baby makes hunger cues and call for latch and swallowing assessment with next feeding.

## 2019-01-15 NOTE — CARE PLAN
Problem: Altered physiologic condition related to immediate post-delivery state and potential for bleeding/hemorrhage  Goal: Patient physiologically stable as evidenced by normal lochia, palpable uterine involution and vital signs within normal limits  Outcome: PROGRESSING AS EXPECTED  Patient is physiologically stable as evidenced by scant lochia rubra, firm fundus one fingerbreadth below the umbilicus, and vital signs WDL.    Problem: Alteration in comfort related to episiotomy, vaginal repair and/or after birth pains  Goal: Patient is able to ambulate, care for self and infant  Outcome: PROGRESSING AS EXPECTED  Discussed pain management and verbalization of pain utilizing the 0-10 pain scale. White board updated with times of pain medication availability and pt requests Motrin be offered as available/will call for additional pain medication. Discussed non-pharmacological pain control therapies and pt provided heat packs, Tucks, and spray per request. Pt is able to ambulate without difficulty and participate in ADLs/care for infant.

## 2019-01-15 NOTE — DISCHARGE SUMMARY
Discharge Summary:      Vanessa Chávez      Admit Date:   2019  Discharge Date:  1/15/2019     Admitting diagnosis:  Pregnancy  Encounter for induction of labor  Discharge Diagnosis: Status post vaginal, spontaneous.  Pregnancy Complications: group B strep (treated)  Tubal Ligation:  no        History:  Past Medical History:   Diagnosis Date   • Amenorrhea 2014   • Tourette's disorder 2012     OB History    Para Term  AB Living   2 1 1   1 1   SAB TAB Ectopic Molar Multiple Live Births   1       0 1      # Outcome Date GA Lbr Nakul/2nd Weight Sex Delivery Anes PTL Lv   2 Term 19 41w0d / 02:19 3.95 kg (8 lb 11.3 oz) M Vag-Spont EPI N SHERIDAN   1 SAB 10/01/17     SAB              Patient has no known allergies.  Patient Active Problem List    Diagnosis Date Noted   • Postpartum care and examination of lactating mother 01/15/2019   • Anxiety and depression 2014   • Motor tic disorder 2012        Hospital Course:   18 y.o. , now para 2, was admitted with the above mentioned diagnosis, underwent Induction of Labor, vaginal, spontaneous. Patient postpartum course was unremarkable, with progressive advancement in diet , ambulation and toleration of oral analgesia. Patient without complaints today and desires discharge.      Vitals:    19 2000 19 0000 19 0800 19   BP: 112/76 113/72 119/71 107/69   Pulse: 79 82 78 86   Resp: 18 16 18 16   Temp: 36.5 °C (97.7 °F) 36.6 °C (97.8 °F) 36.6 °C (97.9 °F) 36.4 °C (97.6 °F)   TempSrc: Oral Oral Temporal Oral   SpO2: 95% 95% 97% 97%   Weight:       Height:           Current Facility-Administered Medications   Medication Dose   • fentaNYL (SUBLIMAZE) injection 100 mcg  100 mcg   • ropivacaine (NAROPIN) injection     • lactated ringers infusion     • oxytocin (PITOCIN) infusion (for postpartum)   mL/hr   • HYDROcodone-acetaminophen (NORCO) 5-325 MG per tablet 1 Tab  1 Tab   •  HYDROcodone/acetaminophen (NORCO)  MG per tablet 1 Tab  1 Tab   • LR infusion     • PRN oxytocin (PITOCIN) (20 Units/1000 mL) PRN for excessive uterine bleeding - See Admin Instr  125-999 mL/hr   • miSOPROStol (CYTOTEC) tablet 600 mcg  600 mcg   • docusate sodium (COLACE) capsule 100 mg  100 mg   • magnesium hydroxide (MILK OF MAGNESIA) suspension 30 mL  30 mL   • prenatal plus vitamin (STUARTNATAL 1+1) 27-1 MG tablet 1 Tab  1 Tab   • oxytocin (PITOCIN) infusion (for induction)  0.5-20 maciel-units/min   • ibuprofen (MOTRIN) tablet 800 mg  800 mg   • HYDROcodone-acetaminophen (NORCO) 5-325 MG per tablet 1 Tab  1 Tab       Exam:  Breast Exam: negative  Abdomen: Abdomen soft, non-tender. BS normal. No masses,  No organomegaly  Fundus Non Tender: yes  Incision: none  Perineum: repair well approximated  Extremity: extremities, peripheral pulses and reflexes normal, no edema, redness or tenderness in the calves or thighs     Labs:  Recent Labs      01/12/19   1510  01/13/19   2058   WBC  9.2  16.7*   RBC  4.03*  3.73*   HEMOGLOBIN  11.4*  10.7*   HEMATOCRIT  34.3*  32.1*   MCV  85.1  86.1   MCH  28.3  28.7   MCHC  33.2*  33.3*   RDW  39.8  40.2   PLATELETCT  207  173   MPV  10.7  10.2        Activity:   Discharge to home  Pelvic Rest x 6 weeks    Assessment:  normal postpartum course  Discharge Assessment: No areas of skin breakdown/redness; surgical incision intact/healing     Follow up: [facility] or [facility] in 5 weeks for vaginal ; 1 week for incision check.      Discharge Meds:   Current Outpatient Prescriptions   Medication Sig Dispense Refill   • ibuprofen (MOTRIN) 800 MG Tab Take 1 Tab by mouth every 8 hours as needed (For cramping after delivery; do not give if patient is receiving ketorolac (Toradol)). 30 Tab 2       MARY WoodNGUERO.

## 2019-01-15 NOTE — PROGRESS NOTES
Pt discharged at approximately 1320 via wheelchair with hospital escort. Infant placed in car seat by parent, and checked by RN.  Pt discharge instructions provided at approximately 1200 prescriptions given to patient. Checked armbands. Clamp and cuddles removed. No further questions at this time. MOB states she will supplement with similac at home if necessary.

## 2019-01-15 NOTE — PROGRESS NOTES
Patient assessment completed. Discussed pain management plan and patient requests Motrin as available. Patient denies dizziness and headaches; states she is voiding w/o difficulty. Reviewed plan of care, all questions answered, and rounding in place.

## 2019-01-15 NOTE — CARE PLAN
Problem: Venous Thromboembolism (VTW)/Deep Vein Thrombosis (DVT) Prevention:  Goal: Patient will participate in Venous Thrombosis (VTE)/Deep Vein Thrombosis (DVT)Prevention Measures  Outcome: PROGRESSING AS EXPECTED  Pt ambulatory. No current s/s of DVT.     Problem: Altered physiologic condition related to immediate post-delivery state and potential for bleeding/hemorrhage  Goal: Patient physiologically stable as evidenced by normal lochia, palpable uterine involution and vital signs within normal limits  Outcome: PROGRESSING AS EXPECTED  Fundus firm, lochia light.

## 2019-02-03 ENCOUNTER — HOSPITAL ENCOUNTER (EMERGENCY)
Facility: MEDICAL CENTER | Age: 19
End: 2019-02-04
Attending: EMERGENCY MEDICINE
Payer: COMMERCIAL

## 2019-02-03 ENCOUNTER — APPOINTMENT (OUTPATIENT)
Dept: RADIOLOGY | Facility: MEDICAL CENTER | Age: 19
End: 2019-02-03
Attending: EMERGENCY MEDICINE
Payer: COMMERCIAL

## 2019-02-03 DIAGNOSIS — F41.9 ANXIETY: ICD-10-CM

## 2019-02-03 DIAGNOSIS — R07.9 CHEST PAIN, UNSPECIFIED TYPE: ICD-10-CM

## 2019-02-03 LAB
ALBUMIN SERPL BCP-MCNC: 3.8 G/DL (ref 3.2–4.9)
ALBUMIN/GLOB SERPL: 1.5 G/DL
ALP SERPL-CCNC: 96 U/L (ref 30–99)
ALT SERPL-CCNC: 13 U/L (ref 2–50)
ANION GAP SERPL CALC-SCNC: 5 MMOL/L (ref 0–11.9)
AST SERPL-CCNC: 17 U/L (ref 12–45)
BASOPHILS # BLD AUTO: 0.2 % (ref 0–1.8)
BASOPHILS # BLD: 0.01 K/UL (ref 0–0.12)
BILIRUB SERPL-MCNC: 0.2 MG/DL (ref 0.1–1.5)
BUN SERPL-MCNC: 14 MG/DL (ref 8–22)
CALCIUM SERPL-MCNC: 9 MG/DL (ref 8.5–10.5)
CHLORIDE SERPL-SCNC: 112 MMOL/L (ref 96–112)
CO2 SERPL-SCNC: 22 MMOL/L (ref 20–33)
CREAT SERPL-MCNC: 0.78 MG/DL (ref 0.5–1.4)
D DIMER PPP IA.FEU-MCNC: 1.79 UG/ML (FEU) (ref 0–0.5)
EKG IMPRESSION: NORMAL
EOSINOPHIL # BLD AUTO: 0.1 K/UL (ref 0–0.51)
EOSINOPHIL NFR BLD: 1.5 % (ref 0–6.9)
ERYTHROCYTE [DISTWIDTH] IN BLOOD BY AUTOMATED COUNT: 38.7 FL (ref 35.9–50)
GLOBULIN SER CALC-MCNC: 2.6 G/DL (ref 1.9–3.5)
GLUCOSE SERPL-MCNC: 91 MG/DL (ref 65–99)
HCG SERPL QL: NEGATIVE
HCT VFR BLD AUTO: 39.5 % (ref 37–47)
HGB BLD-MCNC: 13.1 G/DL (ref 12–16)
IMM GRANULOCYTES # BLD AUTO: 0.02 K/UL (ref 0–0.11)
IMM GRANULOCYTES NFR BLD AUTO: 0.3 % (ref 0–0.9)
LYMPHOCYTES # BLD AUTO: 1.97 K/UL (ref 1–4.8)
LYMPHOCYTES NFR BLD: 29.8 % (ref 22–41)
MCH RBC QN AUTO: 27.9 PG (ref 27–33)
MCHC RBC AUTO-ENTMCNC: 33.2 G/DL (ref 33.6–35)
MCV RBC AUTO: 84.2 FL (ref 81.4–97.8)
MONOCYTES # BLD AUTO: 0.42 K/UL (ref 0–0.85)
MONOCYTES NFR BLD AUTO: 6.4 % (ref 0–13.4)
NEUTROPHILS # BLD AUTO: 4.08 K/UL (ref 2–7.15)
NEUTROPHILS NFR BLD: 61.8 % (ref 44–72)
NRBC # BLD AUTO: 0 K/UL
NRBC BLD-RTO: 0 /100 WBC
PLATELET # BLD AUTO: 268 K/UL (ref 164–446)
PMV BLD AUTO: 10.2 FL (ref 9–12.9)
POTASSIUM SERPL-SCNC: 3.7 MMOL/L (ref 3.6–5.5)
PROT SERPL-MCNC: 6.4 G/DL (ref 6–8.2)
RBC # BLD AUTO: 4.69 M/UL (ref 4.2–5.4)
SODIUM SERPL-SCNC: 139 MMOL/L (ref 135–145)
TROPONIN I SERPL-MCNC: <0.01 NG/ML (ref 0–0.04)
WBC # BLD AUTO: 6.6 K/UL (ref 4.8–10.8)

## 2019-02-03 PROCEDURE — 85379 FIBRIN DEGRADATION QUANT: CPT

## 2019-02-03 PROCEDURE — 71045 X-RAY EXAM CHEST 1 VIEW: CPT

## 2019-02-03 PROCEDURE — 93005 ELECTROCARDIOGRAM TRACING: CPT | Performed by: EMERGENCY MEDICINE

## 2019-02-03 PROCEDURE — 85025 COMPLETE CBC W/AUTO DIFF WBC: CPT

## 2019-02-03 PROCEDURE — 84484 ASSAY OF TROPONIN QUANT: CPT

## 2019-02-03 PROCEDURE — 84703 CHORIONIC GONADOTROPIN ASSAY: CPT

## 2019-02-03 PROCEDURE — 80053 COMPREHEN METABOLIC PANEL: CPT

## 2019-02-03 PROCEDURE — 93005 ELECTROCARDIOGRAM TRACING: CPT

## 2019-02-03 ASSESSMENT — PAIN DESCRIPTION - DESCRIPTORS: DESCRIPTORS: PRESSURE

## 2019-02-04 VITALS
BODY MASS INDEX: 24.46 KG/M2 | HEART RATE: 56 BPM | WEIGHT: 170.86 LBS | RESPIRATION RATE: 23 BRPM | TEMPERATURE: 99.1 F | SYSTOLIC BLOOD PRESSURE: 117 MMHG | HEIGHT: 70 IN | OXYGEN SATURATION: 97 % | DIASTOLIC BLOOD PRESSURE: 87 MMHG

## 2019-02-04 PROCEDURE — 99284 EMERGENCY DEPT VISIT MOD MDM: CPT

## 2019-02-04 PROCEDURE — 71275 CT ANGIOGRAPHY CHEST: CPT

## 2019-02-04 PROCEDURE — 700117 HCHG RX CONTRAST REV CODE 255: Performed by: EMERGENCY MEDICINE

## 2019-02-04 RX ADMIN — IOHEXOL 70 ML: 350 INJECTION, SOLUTION INTRAVENOUS at 00:44

## 2019-02-04 NOTE — ED PROVIDER NOTES
ED Provider Note    CHIEF COMPLAINT  Chief Complaint   Patient presents with   • Chest Pressure     tightness, onset 2 weeks ago, only happens at night, came back again at 1930 tonight and she states she almost passed out    • Shortness of Breath     reports       HPI  Vanessa Chávez is a 18 y.o. female here for evaluation of chest pain, shortness of breath.  The patient states that her chest pain started a few months ago, and that it happens almost daily.  She states that she had a baby 3 weeks ago, and that her pain has been happening more frequently since that time.  She states that this is surrounding a lot of anxiety, and that when her chest pain starts, she becomes very worried, and starts to breathe very quickly, resulting in numbness to her arms and hands.  She states that at times this will subside, but today she became extremely worried and stated that she was having a hard time getting back to normal.  The patient states her chest pain is substernal, nonradiating, and not associated with any lightheadedness.  She states that she did have some shortness of breath, but denies any current abdominal pain.  She has no suicidal or homicidal ideations at this time, and states that she feels as though she is adjusting to having a 3-week-old baby.  Her  is present with her at this time.  Nothing alleviates or exacerbates her symptoms, and she describes the pain as an aching pain.    PAST MEDICAL HISTORY   has a past medical history of Amenorrhea (11/14/2014) and Tourette's disorder (8/24/2012).    SOCIAL HISTORY  Social History     Social History Main Topics   • Smoking status: Never Smoker   • Smokeless tobacco: Never Used   • Alcohol use No   • Drug use: No   • Sexual activity: Yes     Partners: Male     Birth control/ protection: Condom       Family History  No bleeding disorders noted    SURGICAL HISTORY  patient denies any surgical history    CURRENT MEDICATIONS  Home Medications     Reviewed by  Yosvany Cartwright R.N. (Registered Nurse) on 02/03/19 at 2022  Med List Status: Complete   Medication Last Dose Status   ibuprofen (MOTRIN) 800 MG Tab  Active   Prenatal MV-Min-Fe Fum-FA-DHA (PRENATAL 1 PO)  Active                ALLERGIES  No Known Allergies    REVIEW OF SYSTEMS  See HPI for further details. Review of systems as above, otherwise all other systems are negative.     PHYSICAL EXAM  Constitutional: Well developed, well nourished. No acute distress.  HEENT: Normocephalic, atraumatic. Posterior pharynx clear and moist.  Eyes:  EOMI. Normal sclera.  Neck: Supple, Full range of motion, nontender.  Chest/Pulmonary: clear to ausculation. Symmetrical expansion.   Cardio: Regular rate and rhythm with no murmur.   Abdomen: Soft, nontender. No peritoneal signs. No guarding. No palpable masses.  Musculoskeletal: No deformity, no edema, neurovascular intact.   Neuro: Clear speech, appropriate, cooperative, cranial nerves II-XII grossly intact.  Psych: Tearful, anxious    Results for orders placed or performed during the hospital encounter of 02/03/19   CBC w/ Differential   Result Value Ref Range    WBC 6.6 4.8 - 10.8 K/uL    RBC 4.69 4.20 - 5.40 M/uL    Hemoglobin 13.1 12.0 - 16.0 g/dL    Hematocrit 39.5 37.0 - 47.0 %    MCV 84.2 81.4 - 97.8 fL    MCH 27.9 27.0 - 33.0 pg    MCHC 33.2 (L) 33.6 - 35.0 g/dL    RDW 38.7 35.9 - 50.0 fL    Platelet Count 268 164 - 446 K/uL    MPV 10.2 9.0 - 12.9 fL    Neutrophils-Polys 61.80 44.00 - 72.00 %    Lymphocytes 29.80 22.00 - 41.00 %    Monocytes 6.40 0.00 - 13.40 %    Eosinophils 1.50 0.00 - 6.90 %    Basophils 0.20 0.00 - 1.80 %    Immature Granulocytes 0.30 0.00 - 0.90 %    Nucleated RBC 0.00 /100 WBC    Neutrophils (Absolute) 4.08 2.00 - 7.15 K/uL    Lymphs (Absolute) 1.97 1.00 - 4.80 K/uL    Monos (Absolute) 0.42 0.00 - 0.85 K/uL    Eos (Absolute) 0.10 0.00 - 0.51 K/uL    Baso (Absolute) 0.01 0.00 - 0.12 K/uL    Immature Granulocytes (abs) 0.02 0.00 - 0.11 K/uL    NRBC  (Absolute) 0.00 K/uL   Complete Metabolic Panel (CMP)   Result Value Ref Range    Sodium 139 135 - 145 mmol/L    Potassium 3.7 3.6 - 5.5 mmol/L    Chloride 112 96 - 112 mmol/L    Co2 22 20 - 33 mmol/L    Anion Gap 5.0 0.0 - 11.9    Glucose 91 65 - 99 mg/dL    Bun 14 8 - 22 mg/dL    Creatinine 0.78 0.50 - 1.40 mg/dL    Calcium 9.0 8.5 - 10.5 mg/dL    AST(SGOT) 17 12 - 45 U/L    ALT(SGPT) 13 2 - 50 U/L    Alkaline Phosphatase 96 30 - 99 U/L    Total Bilirubin 0.2 0.1 - 1.5 mg/dL    Albumin 3.8 3.2 - 4.9 g/dL    Total Protein 6.4 6.0 - 8.2 g/dL    Globulin 2.6 1.9 - 3.5 g/dL    A-G Ratio 1.5 g/dL   Troponin STAT   Result Value Ref Range    Troponin I <0.01 0.00 - 0.04 ng/mL   D-Dimer (only helpful in low pre-test probability wells critieria. Do not order if patient ruled out by PERC criteria. See Weblinks at top of Labs section)   Result Value Ref Range    D-Dimer Screen 1.79 (H) 0.00 - 0.50 ug/mL (FEU)   BETA-HCG QUALITATIVE SERUM   Result Value Ref Range    Beta-Hcg Qualitative Serum Negative Negative   ESTIMATED GFR   Result Value Ref Range    GFR If African American >60 >60 mL/min/1.73 m 2    GFR If Non African American >60 >60 mL/min/1.73 m 2   EKG   Result Value Ref Range    Report       [facility] Emergency Dept.    Test Date:  2019  Pt Name:    GLENN LARA              Department: ER  MRN:        [id number]                      Room:  Gender:     Female                       Technician: [id number]  :        2000                   Requested By:ER TRIAGE PROTOCOL  Order #:    [id number]                    Alfonso MD:    Measurements  Intervals                                Axis  Rate:       66                           P:          44  NE:         128                          QRS:        78  QRSD:       96                           T:          51  QT:         384  QTc:        403    Interpretive Statements  SINUS ARRHYTHMIA, RATE  55- 74  No previous ECG available for comparison         CT-CTA CHEST PULMONARY ARTERY W/ RECONS   Final Result      1.  No pulmonary embolus appreciated.      DX-CHEST-PORTABLE (1 VIEW)   Final Result         1.  No acute cardiopulmonary disease.            PROCEDURES     MEDICAL RECORD  I have reviewed patient's medical record and pertinent results are listed above.    COURSE & MEDICAL DECISION MAKING  I have reviewed any medical record information, laboratory studies and radiographic results as noted above.    If you have had any blood pressure issues while here in the emergency department, please see your doctor for a further evaluation or work up.    1:08 AM  The patient has a negative workup for any cardiac event, and this is unlikely secondary to her age, with a heart score of 0.  The patient's CT of the chest was done secondary to an elevated d-dimer.  She has family support, and no suicidal or homicidal ideation, despite giving birth 3 weeks ago, she states that she is managing the baby well at home.  At this time her symptoms appear to be more of an anxiety component, and she will follow-up with her primary care doctor for treatment of this.  She is currently breast-feeding, and because of this I will not be writing her any anti-anxiolytics, but she may see her primary care doctor for follow-up.    Differential diagnoses include but not limited to: mi, pe, ptx, pneumonia, anxiety, si/hi    This patient presents with chest pain and anxiety.  At this time, I have counseled the patient/family regarding their medications, pain control, and follow up.  They will continue their medications, if any, as prescribed.  They will return immediately for any worsening symptoms and/or any other medical concerns.  They will see their doctor, or contact the doctor provided, in 1-2 days for follow up.       FINAL IMPRESSION  1. Chest pain, unspecified type    2. Anxiety        Electronically signed by: Wayne Quintero, 2/4/2019 12:04 AM

## 2019-02-04 NOTE — ED TRIAGE NOTES
Vanessa Chávez  18 y.o.  Chief Complaint   Patient presents with   • Chest Pressure     tightness, onset 2 weeks ago, only happens at night, came back again at 1930 tonight and she states she almost passed out    • Shortness of Breath     reports     Patient ambulatory with steady to triage room with above complaint.  Patient appears in moderate discomfort.  Patient tearful in triage.  Had a baby 3 weeks ago.  Reports the symptoms come and go.  Has not been seen for this at all since it started.      EKG completed in triage.  Patient escorted to the lobby and instructed to notify staff of any changes in condition.

## 2019-02-04 NOTE — ED NOTES
Patient is awake and oriented, ambulated from triage to room 3 with steady gait, on continuous cardiac monitoring, VSS. Patient reports experiencing anxiety attacks in the past but states she her CP feels worse this time. Patient coached to take slow deep breaths, ERP to evaluate.

## 2019-02-27 ENCOUNTER — POST PARTUM (OUTPATIENT)
Dept: OBGYN | Facility: CLINIC | Age: 19
End: 2019-02-27
Payer: COMMERCIAL

## 2019-02-27 VITALS — SYSTOLIC BLOOD PRESSURE: 90 MMHG | DIASTOLIC BLOOD PRESSURE: 60 MMHG | BODY MASS INDEX: 23.24 KG/M2 | WEIGHT: 162 LBS

## 2019-02-27 PROCEDURE — 90050 PR POSTPARTUM VISIT: CPT | Performed by: OBSTETRICS & GYNECOLOGY

## 2019-02-27 NOTE — PROGRESS NOTES
Postpartum;    Vanessa Chávez is 18 y.o. female  status post , doing well today. Currently nursing without difficulty    Physical examination;    Breast examination-no dominant masses, no skin retraction, no axillary adenopathy, no evidence of mastitis    Pelvic examination;  External genitalia-no visible lesions  Vagina-no discharge or blood  Cervix-no gross lesions  Uterus-normal size and shape, nontender  Adnexa without mass or tenderness     Abdomen-nondistended positive bowel sounds soft nontender without masses or hepatosplenomegaly      Impression;  Normal postpartum    Plan;  Birth control Nexplanon discussed the risks and benefits referral placed to insurance.  Reviewed procedure for placing Nexplanon.  All questions answered  Annual-1 year

## 2019-11-20 ENCOUNTER — HOSPITAL ENCOUNTER (OUTPATIENT)
Dept: LAB | Facility: MEDICAL CENTER | Age: 19
End: 2019-11-20
Attending: PHYSICIAN ASSISTANT
Payer: COMMERCIAL

## 2019-11-20 ENCOUNTER — OFFICE VISIT (OUTPATIENT)
Dept: MEDICAL GROUP | Facility: PHYSICIAN GROUP | Age: 19
End: 2019-11-20
Payer: COMMERCIAL

## 2019-11-20 VITALS
DIASTOLIC BLOOD PRESSURE: 64 MMHG | SYSTOLIC BLOOD PRESSURE: 90 MMHG | HEART RATE: 68 BPM | TEMPERATURE: 98.2 F | WEIGHT: 145.8 LBS | BODY MASS INDEX: 20.87 KG/M2 | RESPIRATION RATE: 16 BRPM | HEIGHT: 70 IN | OXYGEN SATURATION: 96 %

## 2019-11-20 DIAGNOSIS — G47.26 CIRCADIAN RHYTHM SLEEP DISORDER, SHIFT WORK TYPE: ICD-10-CM

## 2019-11-20 DIAGNOSIS — F41.0 PANIC ATTACKS: ICD-10-CM

## 2019-11-20 DIAGNOSIS — L65.9 HAIR LOSS: ICD-10-CM

## 2019-11-20 DIAGNOSIS — F41.1 GAD (GENERALIZED ANXIETY DISORDER): ICD-10-CM

## 2019-11-20 DIAGNOSIS — Z23 NEED FOR VACCINATION: ICD-10-CM

## 2019-11-20 DIAGNOSIS — F33.2 SEVERE EPISODE OF RECURRENT MAJOR DEPRESSIVE DISORDER, WITHOUT PSYCHOTIC FEATURES (HCC): ICD-10-CM

## 2019-11-20 LAB — TSH SERPL DL<=0.005 MIU/L-ACNC: 1.66 UIU/ML (ref 0.38–5.33)

## 2019-11-20 PROCEDURE — 90472 IMMUNIZATION ADMIN EACH ADD: CPT | Performed by: PHYSICIAN ASSISTANT

## 2019-11-20 PROCEDURE — 36415 COLL VENOUS BLD VENIPUNCTURE: CPT

## 2019-11-20 PROCEDURE — 84443 ASSAY THYROID STIM HORMONE: CPT

## 2019-11-20 PROCEDURE — 90621 MENB-FHBP VACC 2/3 DOSE IM: CPT | Performed by: PHYSICIAN ASSISTANT

## 2019-11-20 PROCEDURE — 90471 IMMUNIZATION ADMIN: CPT | Performed by: PHYSICIAN ASSISTANT

## 2019-11-20 PROCEDURE — 90686 IIV4 VACC NO PRSV 0.5 ML IM: CPT | Performed by: PHYSICIAN ASSISTANT

## 2019-11-20 PROCEDURE — 99214 OFFICE O/P EST MOD 30 MIN: CPT | Mod: 25 | Performed by: PHYSICIAN ASSISTANT

## 2019-11-20 RX ORDER — CITALOPRAM 20 MG/1
20 TABLET ORAL DAILY
Qty: 30 TAB | Refills: 2 | Status: SHIPPED | OUTPATIENT
Start: 2019-11-20 | End: 2020-03-31

## 2019-11-20 RX ORDER — HYDROXYZINE 50 MG/1
50 TABLET, FILM COATED ORAL 3 TIMES DAILY PRN
Qty: 90 TAB | Refills: 2 | Status: SHIPPED | OUTPATIENT
Start: 2019-11-20 | End: 2021-10-25

## 2019-11-20 ASSESSMENT — PATIENT HEALTH QUESTIONNAIRE - PHQ9
SUM OF ALL RESPONSES TO PHQ QUESTIONS 1-9: 22
CLINICAL INTERPRETATION OF PHQ2 SCORE: 6
5. POOR APPETITE OR OVEREATING: 3 - NEARLY EVERY DAY

## 2019-11-20 NOTE — PROGRESS NOTES
Chief Complaint   Patient presents with   • Establish Care     wants thyroid checked,weight gain,hair loss    • Anxiety     discuss meds,worse 2 years ago       HISTORY OF PRESENT ILLNESS: Vanessa Chávez is an established 19 y.o. female here to discuss the evaluation and management of:    JAMES (generalized anxiety disorder)  Panic attacks  Severe episode of recurrent major depressive disorder, without psychotic features (HCC)  Circadian rhythm sleep disorder, shift work type  Patient is a pleasant 19-year-old female here today to discuss anxiety, depression, insomnia.  Patient is tearful during today's appointment.  States she has suffered from anxiety and depression for most of her life.  States in the past year anxiety symptoms have exacerbated.  She mentions that she gave birth to healthy baby boy named Rad almost 1 year ago and since then anxiety symptoms have exacerbated.  She tells me that she has a healthy relationship with her son and committed boyfriend who is the father of the child.  States she has healthy relationships with her family and friends.  Patient is unsure why she feels so anxious and depressed.  States on average she will experience 2-3 panic attacks per week.  States panic attack symptoms include rapid heart rate, rapid breathing, crying, feelings of impending doom, sweating.  States panic attacks can last up to 1 hour duration.  States she has not had a panic attack in the past 2 weeks but prior to this she was experiencing a pain attack daily for 1 month.  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  States she started working graveyard shift 8 months ago and since doing so she may get a few hours of sleep per day.  States she has difficulty falling asleep and staying asleep.  She tells me that she lives with her partner and he works day shift.  States he gets home around 3-3:30 PM and she tries to go to bed at that time but cannot  sleep.  Patient is inquiring about treatment options.  He denies homicidal or suicidal ideation.    Hair loss  Patient states since giving birth she has noticed that she has some hair loss.  States she also feels like she has gained weight.  Admits that she does not eat regularly, does not sleep well, and does not exercise.  Denies temperature intolerance, heart palpitations, bowel habit changes, dry/brittle skin, dry hair, hoarseness of voice.      Patient Active Problem List    Diagnosis Date Noted   • Postpartum care and examination of lactating mother 01/15/2019   • Anxiety and depression 11/14/2014   • Motor tic disorder 08/24/2012       Allergies:Patient has no known allergies.    Current Outpatient Medications   Medication Sig Dispense Refill   • citalopram (CELEXA) 20 MG Tab Take 1 Tab by mouth every day. 30 Tab 2   • hydrOXYzine HCl (ATARAX) 50 MG Tab Take 1 Tab by mouth 3 times a day as needed for Anxiety. 90 Tab 2   • Prenatal MV-Min-Fe Fum-FA-DHA (PRENATAL 1 PO) Take  by mouth.       No current facility-administered medications for this visit.        Social History     Tobacco Use   • Smoking status: Never Smoker   • Smokeless tobacco: Never Used   Substance Use Topics   • Alcohol use: No   • Drug use: No       Family Status   Relation Name Status   • Mo  Alive   • Bro  Alive   • MGMo  Alive   • Fa  Alive   • MUnc  (Not Specified)   • Son  Alive     Family History   Problem Relation Age of Onset   • Other Mother         IPT   • Anemia Mother    • Psychiatric Illness Brother         tourette movement d/o - resolved   • Arthritis Maternal Grandmother    • Diabetes Maternal Grandmother    • Diabetes Maternal Uncle         T2DM   • No Known Problems Son        ROS:  Review of Systems   Constitutional: Negative for fever, chills, weight loss and malaise/fatigue.  Positive for hair loss.  HENT: Negative for ear pain, nosebleeds, congestion, sore throat and neck pain.    Eyes: Negative for blurred vision.  "  Respiratory: Negative for cough, sputum production, shortness of breath and wheezing.    Cardiovascular: Negative for chest pain, palpitations, orthopnea and leg swelling.   Gastrointestinal: Negative for heartburn, nausea, vomiting and abdominal pain.   Genitourinary: Negative for dysuria, urgency and frequency.   Musculoskeletal: Negative for myalgias, back pain and joint pain.   Skin: Negative for rash and itching.   Neurological: Negative for dizziness, tingling, tremors, sensory change, focal weakness and headaches.   Endo/Heme/Allergies: Does not bruise/bleed easily.   Psychiatric/Behavioral: Negative for suicidal ideas and memory loss.  Positive for anxiety, depression, insomnia.  All other systems reviewed and are negative except as in HPI.    Exam: BP (!) 90/64 (BP Location: Left arm, Patient Position: Sitting)   Pulse 68   Temp 36.8 °C (98.2 °F) (Temporal)   Resp 16   Ht 1.778 m (5' 10\")   Wt 66.1 kg (145 lb 12.8 oz)   SpO2 96%  Body mass index is 20.92 kg/m².  General: Normal appearing. No distress.  Patient is tearful during today's appointment.  HEENT: Normocephalic. Eyes conjunctiva clear lids without ptosis, ears normal shape and contour.  Neck: Supple without JVD. Thyroid is not enlarged.  Pulmonary: Clear to ausculation.  Normal effort. No rales, ronchi, or wheezing.  Cardiovascular: Regular rate and rhythm without murmur.   Abdomen: Nondistended.   Neurologic: Grossly nonfocal.  Cranial nerves are normal.   Lymph: No cervical, supraclavicular or axillary lymph nodes are palpable  Skin: Warm and dry.  No rashes or suspicious skin lesions.  Musculoskeletal: Normal gait. No extremity cyanosis, clubbing, or edema.  Psych: Normal mood and affect. Alert and oriented x3. Judgment and insight is normal.    Medical decision-making and discussion:  1. JAMES (generalized anxiety disorder)  Patient has been prescribed Celexa and advised to do the following:  Week 1: Take half a tablet by mouth once daily " for 1 week.  Week 2: Take a full tablet by mouth once daily.  Week 3: Continue full tablet by mouth once daily.  Patient will follow-up in 1 month for reevaluation.  She has agreed to therapy.  A referral has been placed.  Because, so effects and adverse reactions of Celexa with patient.  Advised patient she may experience side effects for 2+ weeks but side effect symptoms should subside and she should start feeling the benefits of the medication around weeks 4-6.    Denies any suicidal or homicidal ideation. Emphasized importance of healthy diet and exercise. Discussed that should the patient have any symptoms they should call suicide prevention hotline or report to the emergency room immediately.    - citalopram (CELEXA) 20 MG Tab; Take 1 Tab by mouth every day.  Dispense: 30 Tab; Refill: 2  - REFERRAL TO PSYCHOLOGY  - TSH WITH REFLEX TO FT4; Future    2. Panic attacks  Patient has been prescribed hydroxyzine.  Discussed with patient she can take 1 tablet by mouth when she experienced an acute panic attack.  Can take medication up to 3 times a day.  Discussed common cervix and adverse reaction of medication with patient.    Same as # 1.    - citalopram (CELEXA) 20 MG Tab; Take 1 Tab by mouth every day.  Dispense: 30 Tab; Refill: 2  - hydrOXYzine HCl (ATARAX) 50 MG Tab; Take 1 Tab by mouth 3 times a day as needed for Anxiety.  Dispense: 90 Tab; Refill: 2  - REFERRAL TO PSYCHOLOGY  - TSH WITH REFLEX TO FT4; Future    3. Severe episode of recurrent major depressive disorder, without psychotic features (HCC)  Same as # 1.    - citalopram (CELEXA) 20 MG Tab; Take 1 Tab by mouth every day.  Dispense: 30 Tab; Refill: 2  - REFERRAL TO PSYCHOLOGY    4. Circadian rhythm sleep disorder, shift work type  She has been prescribed hydroxyzine for sleep.  Advised patient to the following:  Week 1: Take half a tablet by mouth 1 hour prior to bedtime.  Week 2: If half a tablet by mouth does not alleviate sleep deprivation symptoms  to increase dosage to a full tablet by mouth 1 hour prior to bedtime.    Discussed common side effects and adverse reactions of medication with patient.  Discussed importance of positive talk.  Discussed importance of healthy diet, regular exercise routine, practicing good sleep hygiene.  Continue developing coping mechanisms for stress and anxiety.  Continue to monitor.    She will follow-up in 1 month for reevaluation.    - hydrOXYzine HCl (ATARAX) 50 MG Tab; Take 1 Tab by mouth 3 times a day as needed for Anxiety.  Dispense: 90 Tab; Refill: 2    5. Need for vaccination  Vaccinations were administered patient without complication.  VIS forms were provided to patient.  - Influenza Vaccine Quad Injection (PF)  - Meningococcal Vaccine Serogroup B 2-3 Dose IM    6. Hair loss  TSH lab work has been ordered to further evaluate patient.  Patient follow-up in 1 month to review lab work results.  Discussed with patient it is not uncommon for women to experience some hair loss after pregnancy.  Continue to monitor.    - TSH WITH REFLEX TO FT4; Future      Please note that this dictation was created using voice recognition software. I have made every reasonable attempt to correct obvious errors, but I expect that there are errors of grammar and possibly content that I did not discover before finalizing the note.    Assessment/Plan:  1. JAMES (generalized anxiety disorder)  citalopram (CELEXA) 20 MG Tab    REFERRAL TO PSYCHOLOGY    TSH WITH REFLEX TO FT4   2. Panic attacks  citalopram (CELEXA) 20 MG Tab    hydrOXYzine HCl (ATARAX) 50 MG Tab    REFERRAL TO PSYCHOLOGY    TSH WITH REFLEX TO FT4   3. Severe episode of recurrent major depressive disorder, without psychotic features (HCC)  citalopram (CELEXA) 20 MG Tab    REFERRAL TO PSYCHOLOGY   4. Circadian rhythm sleep disorder, shift work type  hydrOXYzine HCl (ATARAX) 50 MG Tab   5. Need for vaccination  Influenza Vaccine Quad Injection (PF)    Meningococcal Vaccine Serogroup B  2-3 Dose IM   6. Hair loss  TSH WITH REFLEX TO FT4       Return in about 1 month (around 12/20/2019).

## 2019-12-20 ENCOUNTER — OFFICE VISIT (OUTPATIENT)
Dept: MEDICAL GROUP | Facility: PHYSICIAN GROUP | Age: 19
End: 2019-12-20
Payer: COMMERCIAL

## 2019-12-20 VITALS
HEIGHT: 70 IN | RESPIRATION RATE: 16 BRPM | BODY MASS INDEX: 20.81 KG/M2 | OXYGEN SATURATION: 95 % | WEIGHT: 145.4 LBS | TEMPERATURE: 98.2 F | DIASTOLIC BLOOD PRESSURE: 72 MMHG | HEART RATE: 84 BPM | SYSTOLIC BLOOD PRESSURE: 92 MMHG

## 2019-12-20 DIAGNOSIS — F41.0 PANIC ATTACKS: ICD-10-CM

## 2019-12-20 DIAGNOSIS — G47.26 CIRCADIAN RHYTHM SLEEP DISORDER, SHIFT WORK TYPE: ICD-10-CM

## 2019-12-20 DIAGNOSIS — F41.1 GAD (GENERALIZED ANXIETY DISORDER): ICD-10-CM

## 2019-12-20 DIAGNOSIS — F33.2 SEVERE EPISODE OF RECURRENT MAJOR DEPRESSIVE DISORDER, WITHOUT PSYCHOTIC FEATURES (HCC): ICD-10-CM

## 2019-12-20 PROCEDURE — 99214 OFFICE O/P EST MOD 30 MIN: CPT | Performed by: PHYSICIAN ASSISTANT

## 2019-12-20 NOTE — PROGRESS NOTES
Chief Complaint   Patient presents with   • Anxiety     follow up       HISTORY OF PRESENT ILLNESS: Vanessa Chávez is an established 19 y.o. female here to discuss the evaluation and management of:    JAMES (generalized anxiety disorder)  Panic attacks  Severe episode of recurrent major depressive disorder, without psychotic features ([facility])  Circadian rhythm sleep disorder, shift work type  Patient is a pleasant 19-year-old female here today to follow-up on anxiety, panic attacks, depression, and circadian rhythm sleep disorder due to shift work.  During her last appointment on 11/20/2019 patient was prescribed hydroxyzine 50 mg tab for sleep deprivation symptoms.  She tells me that she has been taking medication compliantly and experiences no side effects or complications medication.  States since initiating medication she is now sleeping 5 hours per night.  Patient would like to continue medication.  States she is also been working on sleep hygiene.  She mentions that she changed her days off from Friday and Saturday to Monday and Tuesday and has found this is working better for her.  She admits that she needs to decrease caffeine consumption.  States on average she drinks 3 cans of Dr. Pepper per day.  Patient was also prescribed Celexa 20 mg tab once daily during her last appointment.  States she is been taking medication compliantly and experiences no side effects or complications medication.  States since initiating medication she has not experienced a panic attack.  States overall mood has improved and she feels less anxious.  She feels this medication is working well for her.  Patient's weight is stable.  Patient would like to continue medication.  She denies homicidal or suicidal ideation.      Patient Active Problem List    Diagnosis Date Noted   • JAMES (generalized anxiety disorder) 12/20/2019   • Panic attacks 12/20/2019   • Severe episode of recurrent major depressive disorder, without psychotic  features ([facility]) 12/20/2019   • Circadian rhythm sleep disorder, shift work type 12/20/2019   • Postpartum care and examination of lactating mother 01/15/2019   • Anxiety and depression 11/14/2014   • Motor tic disorder 08/24/2012       Allergies:Patient has no known allergies.    Current Outpatient Medications   Medication Sig Dispense Refill   • citalopram (CELEXA) 20 MG Tab Take 1 Tab by mouth every day. 30 Tab 2   • hydrOXYzine HCl (ATARAX) 50 MG Tab Take 1 Tab by mouth 3 times a day as needed for Anxiety. 90 Tab 2   • Prenatal MV-Min-Fe Fum-FA-DHA (PRENATAL 1 PO) Take  by mouth.       No current facility-administered medications for this visit.        Social History     Tobacco Use   • Smoking status: Never Smoker   • Smokeless tobacco: Never Used   Substance Use Topics   • Alcohol use: No   • Drug use: No       Family Status   Relation Name Status   • Mo  Alive   • Bro  Alive   • MGMo  Alive   • Fa  Alive   • MUnc  (Not Specified)   • Son  Alive     Family History   Problem Relation Age of Onset   • Other Mother         IPT   • Anemia Mother    • Psychiatric Illness Brother         tourette movement d/o - resolved   • Arthritis Maternal Grandmother    • Diabetes Maternal Grandmother    • Diabetes Maternal Uncle         T2DM   • No Known Problems Son        ROS:  Review of Systems   Constitutional: Negative for fever, chills, weight loss and malaise/fatigue.   HENT: Negative for ear pain, nosebleeds, congestion, sore throat and neck pain.    Eyes: Negative for blurred vision.   Respiratory: Negative for cough, sputum production, shortness of breath and wheezing.    Cardiovascular: Negative for chest pain, palpitations, orthopnea and leg swelling.   Gastrointestinal: Negative for heartburn, nausea, vomiting and abdominal pain.   Genitourinary: Negative for dysuria, urgency and frequency.   Musculoskeletal: Negative for myalgias, back pain and joint pain.   Skin: Negative for rash and itching.   Neurological:  "Negative for dizziness, tingling, tremors, sensory change, focal weakness and headaches.   Endo/Heme/Allergies: Does not bruise/bleed easily.   Psychiatric/Behavioral: Negative for suicidal ideas and memory loss.  Positive for anxiety, depression, and sleep deprivation.  All other systems reviewed and are negative except as in HPI.    Exam: BP (!) 92/72 (BP Location: Left arm, Patient Position: Sitting)   Pulse 84   Temp 36.8 °C (98.2 °F) (Temporal)   Resp 16   Ht 1.778 m (5' 10\")   Wt 66 kg (145 lb 6.4 oz)   SpO2 95%  Body mass index is 20.86 kg/m².  General: Normal appearing. No distress.  HEENT: Normocephalic. Eyes conjunctiva clear lids without ptosis, ears normal shape and contour.  Neck: Supple without JVD. Thyroid is not enlarged.  Pulmonary: Clear to ausculation.  Normal effort. No rales, ronchi, or wheezing.  Cardiovascular: Regular rate and rhythm without murmur.   Abdomen: Nondistended.   Neurologic: Grossly nonfocal.  Cranial nerves are normal.   Lymph: No cervical or supraclavicular lymph nodes are palpable  Skin: Warm and dry.  No rashes or suspicious skin lesions.  Musculoskeletal: Normal gait. No extremity cyanosis, clubbing, or edema.  Psych: Normal mood and affect. Alert and oriented x3. Judgment and insight is normal.    Medical decision-making and discussion:  1. JAMES (generalized anxiety disorder)  2. Panic attacks  3. Severe episode of recurrent major depressive disorder, without psychotic features (HCC)  4. Circadian rhythm sleep disorder, shift work type    Patient is feeling well on current medications. Will continue. Denies any suicidal or homicidal ideation. Emphasized importance of healthy diet and exercise. Discussed that should the patient have any symptoms they should call suicide prevention hotline or report to the emergency room immediately.    Vies patient continue work on diet, exercise, sleep hygiene.  Advised patient to decrease sugar and caffeine consumption.  Continue " working on coping mechanisms for stress and anxiety.  Continue to monitor.  Patient follow-up in 6 months reevaluation.      Please note that this dictation was created using voice recognition software. I have made every reasonable attempt to correct obvious errors, but I expect that there are errors of grammar and possibly content that I did not discover before finalizing the note.    Assessment/Plan:  1. JAMES (generalized anxiety disorder)     2. Panic attacks     3. Severe episode of recurrent major depressive disorder, without psychotic features (HCC)     4. Circadian rhythm sleep disorder, shift work type         Return in about 6 months (around 6/20/2020).

## 2020-02-20 NOTE — PROGRESS NOTES
Check iron, TIBC and ferritin  Referral to cardiology   Follow up with DR. SAMI Montana in 3 months    Pt here today for postpartum exam.  Delivery Date and Type 1/13/19 vaginal   Currently: breast feeding  BCM: nexplanon, [facility].   Mood. Anxiety and depression  LMP: N/A  Screening given today

## 2020-03-31 DIAGNOSIS — F41.1 GAD (GENERALIZED ANXIETY DISORDER): ICD-10-CM

## 2020-03-31 DIAGNOSIS — F33.2 SEVERE EPISODE OF RECURRENT MAJOR DEPRESSIVE DISORDER, WITHOUT PSYCHOTIC FEATURES (HCC): ICD-10-CM

## 2020-03-31 DIAGNOSIS — F41.0 PANIC ATTACKS: ICD-10-CM

## 2020-03-31 RX ORDER — CITALOPRAM 20 MG/1
TABLET ORAL
Qty: 90 TAB | Refills: 2 | Status: SHIPPED | OUTPATIENT
Start: 2020-03-31 | End: 2021-10-25

## 2020-03-31 RX ORDER — CITALOPRAM 20 MG/1
TABLET ORAL
Qty: 90 TAB | Refills: 0 | Status: SHIPPED | OUTPATIENT
Start: 2020-03-31 | End: 2020-03-31 | Stop reason: SDUPTHER

## 2020-03-31 NOTE — TELEPHONE ENCOUNTER
Received request via: Patient    Was the patient seen in the last year in this department? Yes    Does the patient have an active prescription (recently filled or refills available) for medication(s) requested? No         Pt stated she thought you had sent in enough refills to last her for the year.  Only had two refills last time.

## 2021-04-19 ENCOUNTER — NON-PROVIDER VISIT (OUTPATIENT)
Dept: URGENT CARE | Facility: PHYSICIAN GROUP | Age: 21
End: 2021-04-19

## 2021-04-19 DIAGNOSIS — Z11.1 PPD SCREENING TEST: ICD-10-CM

## 2021-04-19 PROCEDURE — 86580 TB INTRADERMAL TEST: CPT | Performed by: NURSE PRACTITIONER

## 2021-04-19 NOTE — NON-PROVIDER
Vanessa Chávez is a 20 y.o. female here for a non-provider visit for PPD placement -- Step 1 of 2    Reason for PPD:  work requirement    1. TB evaluation questionnaire completed by patient? Yes      -  If any answers marked yes did you contact a provider prior to placing? Not Indicated  2.  Patient notified to return to clinic for reading on: 04/21/21 after 09:08 or 04/22/21 before 9:08  3.  PPD Placement documentation completed on TB evaluation questionnaire? Yes  4.  Location of TB evaluation questionnaire filed:  file

## 2021-04-21 ENCOUNTER — NON-PROVIDER VISIT (OUTPATIENT)
Dept: URGENT CARE | Facility: PHYSICIAN GROUP | Age: 21
End: 2021-04-21

## 2021-04-21 LAB — TB WHEAL 3D P 5 TU DIAM: NORMAL MM

## 2021-04-21 NOTE — NON-PROVIDER
Vanessa Chávez is a 20 y.o. female here for a non-provider visit for PPD reading -- Step 1 of 2.      1.  Resulted in Epic under enter/edit results? Yes   2.  TB evaluation questionnaire scanned into chart and original given to patient?Yes      3. Was induration greater than 0 mm? No.    If Step 1 of 2, when is patient returning for second step (delete if N/A): 1-3 weeks from placement 4/19

## 2021-04-26 ENCOUNTER — NON-PROVIDER VISIT (OUTPATIENT)
Dept: URGENT CARE | Facility: PHYSICIAN GROUP | Age: 21
End: 2021-04-26

## 2021-04-26 DIAGNOSIS — Z11.1 PPD SCREENING TEST: ICD-10-CM

## 2021-04-26 PROCEDURE — 99999 PR NO CHARGE: CPT | Performed by: FAMILY MEDICINE

## 2021-04-26 PROCEDURE — 86580 TB INTRADERMAL TEST: CPT | Performed by: FAMILY MEDICINE

## 2021-04-26 NOTE — NON-PROVIDER
Vanessa Chávez is a 20 y.o. female here for a non-provider visit for PPD placement -- Step 2 of 2    Reason for PPD:  work requirement    1. TB evaluation questionnaire completed by patient? Yes      -  If any answers marked yes did you contact a provider prior to placing? Not Indicated  2.  Patient notified to return to clinic for reading on: 04/28/21 after 2:44 or 04/29/21 before 2:44  3.  PPD Placement documentation completed on TB evaluation questionnaire? Yes  4.  Location of TB evaluation questionnaire filed:  file

## 2021-04-28 ENCOUNTER — NON-PROVIDER VISIT (OUTPATIENT)
Dept: URGENT CARE | Facility: PHYSICIAN GROUP | Age: 21
End: 2021-04-28

## 2021-04-28 LAB — TB WHEAL 3D P 5 TU DIAM: NORMAL MM

## 2021-09-21 ENCOUNTER — GYNECOLOGY VISIT (OUTPATIENT)
Dept: OBGYN | Facility: CLINIC | Age: 21
End: 2021-09-21
Payer: COMMERCIAL

## 2021-09-21 VITALS — DIASTOLIC BLOOD PRESSURE: 62 MMHG | BODY MASS INDEX: 22.96 KG/M2 | WEIGHT: 160 LBS | SYSTOLIC BLOOD PRESSURE: 110 MMHG

## 2021-09-21 DIAGNOSIS — Z32.01 POSITIVE PREGNANCY TEST: ICD-10-CM

## 2021-09-21 DIAGNOSIS — N93.8 DUB (DYSFUNCTIONAL UTERINE BLEEDING): ICD-10-CM

## 2021-09-21 LAB — IN CLINIC OB SCAN: NORMAL

## 2021-09-21 PROCEDURE — 76857 US EXAM PELVIC LIMITED: CPT | Performed by: OBSTETRICS & GYNECOLOGY

## 2021-09-21 PROCEDURE — 99213 OFFICE O/P EST LOW 20 MIN: CPT | Mod: 25 | Performed by: OBSTETRICS & GYNECOLOGY

## 2021-09-21 NOTE — NON-PROVIDER
Pt here for DUB visit  #[phone] (home)   First prenatal care  Pt states no complaints  Pharmacy verified.

## 2021-09-21 NOTE — PROGRESS NOTES
CC: Confirmation of Pregnancy    HPI: Pt is a 20 yo  lmp 21 who presents for evaluation of amenorrhea.  She has had no bleeding since her last menses.  A urine pregnancy test was positive.  She denies vaginal spotting or pain.  She notes denies nausea or vomiting.  History of depression and anxiety, not currently taking medications.  She has felt the baby move for approximately 1 week.    Past Medical History:   Diagnosis Date   • Amenorrhea 2014   • Anxiety    • Depression    • Tourette's disorder 2012       History reviewed. No pertinent surgical history.      Current Outpatient Medications:   •  Prenatal MV-Min-Fe Fum-FA-DHA (PRENATAL 1 PO), Take  by mouth., Disp: , Rfl:   •  citalopram (CELEXA) 20 MG Tab, Take 1 tablet by mouth once daily (Patient not taking: Reported on 2021), Disp: 90 Tab, Rfl: 2  •  hydrOXYzine HCl (ATARAX) 50 MG Tab, Take 1 Tab by mouth 3 times a day as needed for Anxiety. (Patient not taking: Reported on 2021), Disp: 90 Tab, Rfl: 2    Patient has no known allergies.    Family History   Problem Relation Age of Onset   • Other Mother         IPT   • Anemia Mother    • Psychiatric Illness Brother         tourette movement d/o - resolved   • Arthritis Maternal Grandmother    • Diabetes Maternal Grandmother    • Diabetes Maternal Uncle         T2DM   • No Known Problems Son        Social History     Socioeconomic History   • Marital status: Single     Spouse name: Not on file   • Number of children: Not on file   • Years of education: Not on file   • Highest education level: Not on file   Occupational History   • Not on file   Tobacco Use   • Smoking status: Never Smoker   • Smokeless tobacco: Never Used   Vaping Use   • Vaping Use: Never used   Substance and Sexual Activity   • Alcohol use: No   • Drug use: No   • Sexual activity: Yes     Partners: Male     Birth control/protection: Condom     Comment: Committed relationship.   Other Topics Concern   •  Behavioral problems Not Asked   • Interpersonal relationships Not Asked   • Sad or not enjoying activities Not Asked   • Suicidal thoughts Not Asked   • Poor school performance Not Asked   • Reading difficulties Not Asked   • Speech difficulties Not Asked   • Writing difficulties Not Asked   • Inadequate sleep Not Asked   • Excessive TV viewing Not Asked   • Excessive video game use Not Asked   • Inadequate exercise Not Asked   • Sports related Not Asked   • Poor diet Not Asked   • Family concerns for drug/alcohol abuse Not Asked   • Poor oral hygiene Not Asked   • Bike safety Not Asked   • Family concerns vehicle safety Not Asked   Social History Narrative    Lives with Mother, Mom's boyfriend, Older brother, two other younger children (not-related)    + Tob exposure    Pet - 4 dogs, 2 birds, fish, hamster    Starting as Freshman at [employer].     Social Determinants of Health     Financial Resource Strain:    • Difficulty of Paying Living Expenses:    Food Insecurity:    • Worried About Running Out of Food in the Last Year:    • Ran Out of Food in the Last Year:    Transportation Needs:    • Lack of Transportation (Medical):    • Lack of Transportation (Non-Medical):    Physical Activity:    • Days of Exercise per Week:    • Minutes of Exercise per Session:    Stress:    • Feeling of Stress :    Social Connections:    • Frequency of Communication with Friends and Family:    • Frequency of Social Gatherings with Friends and Family:    • Attends Christianity Services:    • Active Member of Clubs or Organizations:    • Attends Club or Organization Meetings:    • Marital Status:    Intimate Partner Violence:    • Fear of Current or Ex-Partner:    • Emotionally Abused:    • Physically Abused:    • Sexually Abused:        OB History    Para Term  AB Living   3 1 1 0 1 1   SAB TAB Ectopic Molar Multiple Live Births   1 0 0 0 0 1       ROS: negative for dizziness, SOB, chest pain, palpitations, dysuria, vaginal  discharge.    /62   Wt 72.6 kg (160 lb)     GENERAL: Alert, in no apparent distress  PSYCHIATRIC: Appropriate affect, intact insight and judgement.  ABDOMEN: Soft, nontender, nondistended.  Fundus palpable at umbillicus. No hepatosplenomegaly.   No rebound or guarding.  No inguinal lymphadenopathy.  BACK: No CVA tenderness  EXTREMITIES: No edema, no calf tenderness.      Limited Abdominal US - performed and interpreted by me    Single gestational sac.  Placenta anterior    Suazo IUP with + fetal cardiac activity noted. FHTs 153 bpm    BPD = 4.75 cm, AC = 13.84 cm, FL = 3.19 cm, C/W 19+6/7 wks.    MIGDALIA by US 2/9/22.    Ovaries not visualized.  Cervical length = 3.23 cm    ASSESSMENT/PLAN:  1. DUB visit - Suazo IUP at 19+6/7 wks. MIGDALIA will be 2/9/22 by US today.  Prenatal Vitamins.  F/U for NOB appointment next available.   2.  History of depression and anxiety -the patient states she is doing well, declines medication or therapy at this time.

## 2021-09-27 ENCOUNTER — HOSPITAL ENCOUNTER (OUTPATIENT)
Facility: MEDICAL CENTER | Age: 21
End: 2021-09-27
Attending: NURSE PRACTITIONER
Payer: COMMERCIAL

## 2021-09-27 ENCOUNTER — INITIAL PRENATAL (OUTPATIENT)
Dept: OBGYN | Facility: CLINIC | Age: 21
End: 2021-09-27
Payer: COMMERCIAL

## 2021-09-27 ENCOUNTER — APPOINTMENT (OUTPATIENT)
Dept: OBGYN | Facility: CLINIC | Age: 21
End: 2021-09-27
Payer: COMMERCIAL

## 2021-09-27 VITALS
DIASTOLIC BLOOD PRESSURE: 60 MMHG | WEIGHT: 162 LBS | HEIGHT: 71 IN | SYSTOLIC BLOOD PRESSURE: 104 MMHG | BODY MASS INDEX: 22.68 KG/M2

## 2021-09-27 DIAGNOSIS — Z34.92 SUPERVISION OF LOW-RISK PREGNANCY, SECOND TRIMESTER: Primary | ICD-10-CM

## 2021-09-27 LAB
APPEARANCE UR: NORMAL
BILIRUB UR STRIP-MCNC: NORMAL MG/DL
COLOR UR AUTO: NORMAL
GLUCOSE UR STRIP.AUTO-MCNC: NEGATIVE MG/DL
KETONES UR STRIP.AUTO-MCNC: NEGATIVE MG/DL
LEUKOCYTE ESTERASE UR QL STRIP.AUTO: NORMAL
NITRITE UR QL STRIP.AUTO: NEGATIVE
PH UR STRIP.AUTO: 6 [PH] (ref 5–8)
PROT UR QL STRIP: NEGATIVE MG/DL
RBC UR QL AUTO: NEGATIVE
SP GR UR STRIP.AUTO: 1.02
UROBILINOGEN UR STRIP-MCNC: NORMAL MG/DL

## 2021-09-27 PROCEDURE — 0500F INITIAL PRENATAL CARE VISIT: CPT | Performed by: NURSE PRACTITIONER

## 2021-09-27 PROCEDURE — 87591 N.GONORRHOEAE DNA AMP PROB: CPT

## 2021-09-27 PROCEDURE — 88175 CYTOPATH C/V AUTO FLUID REDO: CPT

## 2021-09-27 PROCEDURE — 90471 IMMUNIZATION ADMIN: CPT | Performed by: NURSE PRACTITIONER

## 2021-09-27 PROCEDURE — 90686 IIV4 VACC NO PRSV 0.5 ML IM: CPT | Performed by: NURSE PRACTITIONER

## 2021-09-27 PROCEDURE — 81002 URINALYSIS NONAUTO W/O SCOPE: CPT | Performed by: NURSE PRACTITIONER

## 2021-09-27 PROCEDURE — 87491 CHLMYD TRACH DNA AMP PROBE: CPT

## 2021-09-27 ASSESSMENT — EDINBURGH POSTNATAL DEPRESSION SCALE (EPDS)
THINGS HAVE BEEN GETTING ON TOP OF ME: NO, MOST OF THE TIME I HAVE COPED QUITE WELL
THE THOUGHT OF HARMING MYSELF HAS OCCURRED TO ME: NEVER
TOTAL SCORE: 6
I HAVE BEEN SO UNHAPPY THAT I HAVE BEEN CRYING: NO, NEVER
I HAVE FELT SAD OR MISERABLE: NO, NOT AT ALL
I HAVE LOOKED FORWARD WITH ENJOYMENT TO THINGS: AS MUCH AS I EVER DID
I HAVE BEEN ABLE TO LAUGH AND SEE THE FUNNY SIDE OF THINGS: AS MUCH AS I ALWAYS COULD
I HAVE BEEN SO UNHAPPY THAT I HAVE HAD DIFFICULTY SLEEPING: NOT AT ALL
I HAVE BLAMED MYSELF UNNECESSARILY WHEN THINGS WENT WRONG: NOT VERY OFTEN
I HAVE FELT SCARED OR PANICKY FOR NO GOOD REASON: YES, SOMETIMES
I HAVE BEEN ANXIOUS OR WORRIED FOR NO GOOD REASON: YES, SOMETIMES

## 2021-09-27 ASSESSMENT — ENCOUNTER SYMPTOMS
GASTROINTESTINAL NEGATIVE: 1
PSYCHIATRIC NEGATIVE: 1
NEUROLOGICAL NEGATIVE: 1
CONSTITUTIONAL NEGATIVE: 1
RESPIRATORY NEGATIVE: 1
EYES NEGATIVE: 1
CARDIOVASCULAR NEGATIVE: 1
MUSCULOSKELETAL NEGATIVE: 1

## 2021-09-27 NOTE — PROGRESS NOTES
S:  Vanessa Chávez is a 21 y.o.  who presents for her new OB exam.  She is 20w5d with and MIGDALIA of Estimated Date of Delivery: 22 based off of US . She has no complaints.  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 DeniesER visits or previous care in this pregnancy.     Desires AFP.  Declines CF.  Denies VB, LOF, or cramping.  Denies dysuria, vaginal DC. Reports normal fetal movement.     She does have a hx of anxiety/depression, no meds currently but feels stable.     Pt is single and lives with FOB and other son.  Pregnancy is planned.      Discussed diet and exercise during pregnancy. Encouraged good nutrition, and daily exercise including walking or swimming. Discussed expected weight gain during pregnancy. Discussed adequate hydration during pregnancy.  Review of Systems   Constitutional: Negative.    HENT: Negative.    Eyes: Negative.    Respiratory: Negative.    Cardiovascular: Negative.    Gastrointestinal: Negative.    Genitourinary: Negative.    Musculoskeletal: Negative.    Skin: Negative.    Neurological: Negative.    Endo/Heme/Allergies: Negative.    Psychiatric/Behavioral: Negative.    All other systems reviewed and are negative.      Past Medical History:   Diagnosis Date   • Amenorrhea 2014   • Anxiety    • Depression    • Tourette's disorder 2012     Family History   Problem Relation Age of Onset   • Other Mother         IPT   • Anemia Mother    • Psychiatric Illness Brother         tourette movement d/o - resolved   • Arthritis Maternal Grandmother    • Diabetes Maternal Grandmother    • Diabetes Maternal Uncle         T2DM   • No Known Problems Son      Social History     Socioeconomic History   • Marital status: Single     Spouse name: Not on file   • Number of children: Not on file   • Years of education: Not on file   • Highest education level: Not on file   Occupational History   • Not on file   Tobacco Use   • Smoking  status: Never Smoker   • Smokeless tobacco: Never Used   Vaping Use   • Vaping Use: Never used   Substance and Sexual Activity   • Alcohol use: No   • Drug use: No   • Sexual activity: Yes     Partners: Male     Comment: Committed relationship.   Other Topics Concern   • Behavioral problems Not Asked   • Interpersonal relationships Not Asked   • Sad or not enjoying activities Not Asked   • Suicidal thoughts Not Asked   • Poor school performance Not Asked   • Reading difficulties Not Asked   • Speech difficulties Not Asked   • Writing difficulties Not Asked   • Inadequate sleep Not Asked   • Excessive TV viewing Not Asked   • Excessive video game use Not Asked   • Inadequate exercise Not Asked   • Sports related Not Asked   • Poor diet Not Asked   • Family concerns for drug/alcohol abuse Not Asked   • Poor oral hygiene Not Asked   • Bike safety Not Asked   • Family concerns vehicle safety Not Asked   Social History Narrative    Lives with Mother, Mom's boyfriend, Older brother, two other younger children (not-related)    + Tob exposure    Pet - 4 dogs, 2 birds, fish, hamster    Starting as Freshman at [employer].     Social Determinants of Health     Financial Resource Strain:    • Difficulty of Paying Living Expenses:    Food Insecurity:    • Worried About Running Out of Food in the Last Year:    • Ran Out of Food in the Last Year:    Transportation Needs:    • Lack of Transportation (Medical):    • Lack of Transportation (Non-Medical):    Physical Activity:    • Days of Exercise per Week:    • Minutes of Exercise per Session:    Stress:    • Feeling of Stress :    Social Connections:    • Frequency of Communication with Friends and Family:    • Frequency of Social Gatherings with Friends and Family:    • Attends Hoahaoism Services:    • Active Member of Clubs or Organizations:    • Attends Club or Organization Meetings:    • Marital Status:    Intimate Partner Violence:    • Fear of Current or Ex-Partner:    •  "Emotionally Abused:    • Physically Abused:    • Sexually Abused:      OB History    Para Term  AB Living   3 1 1   1 1   SAB TAB Ectopic Molar Multiple Live Births   1       0 1      # Outcome Date GA Lbr Nakul/2nd Weight Sex Delivery Anes PTL Lv   3 Current            2 Term 19 41w0d / 02:19 3.95 kg (8 lb 11.3 oz) M Vag-Spont EPI N SHERIDAN   1 SAB 10/01/17     SAB              O:  /60   Ht 1.803 m (5' 11\")   Wt 73.5 kg (162 lb)    See Prenatal Physical.    Wet mount: deferred  Physical Exam  Vitals and nursing note reviewed.   Constitutional:       Appearance: Normal appearance. She is normal weight.   HENT:      Head: Normocephalic and atraumatic.      Right Ear: External ear normal.      Left Ear: External ear normal.      Nose: Nose normal.      Mouth/Throat:      Mouth: Mucous membranes are dry.      Pharynx: Oropharynx is clear.   Eyes:      Extraocular Movements: Extraocular movements intact.      Conjunctiva/sclera: Conjunctivae normal.      Pupils: Pupils are equal, round, and reactive to light.   Cardiovascular:      Rate and Rhythm: Normal rate and regular rhythm.      Pulses: Normal pulses.      Heart sounds: Normal heart sounds.   Pulmonary:      Effort: Pulmonary effort is normal. No respiratory distress.      Breath sounds: Normal breath sounds.   Abdominal:      General: There is distension.      Tenderness: There is no abdominal tenderness.   Genitourinary:     General: Normal vulva.      Rectum: Normal.   Musculoskeletal:         General: Normal range of motion.      Cervical back: Normal range of motion and neck supple.      Comments: Normal breast exam   Skin:     General: Skin is warm and dry.      Capillary Refill: Capillary refill takes less than 2 seconds.   Neurological:      General: No focal deficit present.      Mental Status: She is alert and oriented to person, place, and time. Mental status is at baseline.   Psychiatric:         Mood and Affect: Mood normal.    "      Behavior: Behavior normal.         Thought Content: Thought content normal.         Judgment: Judgment normal.           A:   1.  IUP @ 20w5d per US        2.  S=D        3.  See problem list below               Patient Active Problem List    Diagnosis Date Noted   • JAMES (generalized anxiety disorder) 12/20/2019   • Panic attacks 12/20/2019   • Severe episode of recurrent major depressive disorder, without psychotic features (HCC) 12/20/2019   • Circadian rhythm sleep disorder, shift work type 12/20/2019   • Anxiety and depression 11/14/2014   • Motor tic disorder 08/24/2012         P:  1.  GC/CT & pap done        2.  Prenatal labs ordered - lab slip given        3.  Discussed PNV, diet, avoidances and adequate water intake        4.  NOB packet given        5.  Return to office in 4 wks        6.  Complete OB US in ASAP wks        7.  Flu shot given        8.  Declines medications or referral at this time for anxiety/depression.   Orders Placed This Encounter   • US-OB 2ND 3RD TRI COMPLETE   • INFLUENZA VACCINE QUAD INJ (PF)   • AFP TETRA   • HEP C VIRUS ANTIBODY   • PREG CNTR PRENATAL PN   • THINPREP RFLX HPV ASCUS W/CTNG   • URINE DRUG SCREEN W/CONF (AR)

## 2021-09-27 NOTE — NON-PROVIDER
Subjective:   Vanessa Chávez is a 21 y.o.  who presents for her new OB exam.  She is 20w5d with an MIGDALIA of Estimated Date of Delivery: 22 by US. She is feeling well and has no concerns at this time. Denies VB, LOF, contractions or pain. No ER visits or previous care in this pregnancy. Denies dysuria, vaginal DC, fever. Reports good fetal movement. Desires about AFP.  Declines CF.  Declines NIPT testing.     Past Medical History:   Diagnosis Date   • Amenorrhea 2014   • Anxiety    • Depression    • Tourette's disorder 2012       Psych Hx: Patient reports a history of depression and anxiety, is not taking any medications and feels well.   Denies PTSD, bipolar or any other psychological issues.     EPDS today:     History reviewed. No pertinent surgical history.  Had wisdom teeth removed in   Review of Systems   Constitutional: Negative.    HENT: Negative.    Eyes: Negative.    Respiratory: Negative.    Cardiovascular: Negative.    Gastrointestinal: Negative.    Genitourinary: Negative.    Musculoskeletal: Negative.    Skin: Negative.    Neurological: Negative.    Endo/Heme/Allergies: Negative.    Psychiatric/Behavioral: Negative.          OB History    Para Term  AB Living   3 1 1   1 1   SAB TAB Ectopic Molar Multiple Live Births   1       0 1      # Outcome Date GA Lbr Nakul/2nd Weight Sex Delivery Anes PTL Lv   3 Current            2 Term / 41w0d / 02:19 3.95 kg (8 lb 11.3 oz) M Vag-Spont EPI N SHERIDAN   1 SAB 10/01/17     SAB           Gynecological Hx: Denies any hx of STIs, including HSV. Denies any vulvovaginal disorders and no hx of abnormal cervical cytology. Last pap today    Sexual Hx: One current male partner, who is FOB     Contraceptive Hx: Has not used in the past.    Family History   Problem Relation Age of Onset   • Other Mother         IPT   • Anemia Mother    • Psychiatric Illness Brother         tourette movement d/o - resolved   • Arthritis Maternal  Grandmother    • Diabetes Maternal Grandmother    • Diabetes Maternal Uncle         T2DM   • No Known Problems Son      Denies any genetic disorders in family history.     Social History     Socioeconomic History   • Marital status: Single     Spouse name: Not on file   • Number of children: Not on file   • Years of education: Not on file   • Highest education level: Not on file   Occupational History   • Not on file   Tobacco Use   • Smoking status: Never Smoker   • Smokeless tobacco: Never Used   Vaping Use   • Vaping Use: Never used   Substance and Sexual Activity   • Alcohol use: No   • Drug use: No   • Sexual activity: Yes     Partners: Male     Comment: Committed relationship.   Other Topics Concern   • Behavioral problems Not Asked   • Interpersonal relationships Not Asked   • Sad or not enjoying activities Not Asked   • Suicidal thoughts Not Asked   • Poor school performance Not Asked   • Reading difficulties Not Asked   • Speech difficulties Not Asked   • Writing difficulties Not Asked   • Inadequate sleep Not Asked   • Excessive TV viewing Not Asked   • Excessive video game use Not Asked   • Inadequate exercise Not Asked   • Sports related Not Asked   • Poor diet Not Asked   • Family concerns for drug/alcohol abuse Not Asked   • Poor oral hygiene Not Asked   • Bike safety Not Asked   • Family concerns vehicle safety Not Asked   Social History Narrative    Lives with Mother, Mom's boyfriend, Older brother, two other younger children (not-related)    + Tob exposure    Pet - 4 dogs, 2 birds, fish, hamster    Starting as Freshman at [employer].     Social Determinants of Health     Financial Resource Strain:    • Difficulty of Paying Living Expenses:    Food Insecurity:    • Worried About Running Out of Food in the Last Year:    • Ran Out of Food in the Last Year:    Transportation Needs:    • Lack of Transportation (Medical):    • Lack of Transportation (Non-Medical):    Physical Activity:    • Days of Exercise per  "Week:    • Minutes of Exercise per Session:    Stress:    • Feeling of Stress :    Social Connections:    • Frequency of Communication with Friends and Family:    • Frequency of Social Gatherings with Friends and Family:    • Attends Yazidi Services:    • Active Member of Clubs or Organizations:    • Attends Club or Organization Meetings:    • Marital Status:    Intimate Partner Violence:    • Fear of Current or Ex-Partner:    • Emotionally Abused:    • Physically Abused:    • Sexually Abused:        FOB is involved and lives with Vanessa Chávez and her son.  Pregnancy is planned.  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   Denies alcohol use, drug use, or tobacco use in pregnancy.   Denies any current or hx of sexual, emotional or physical abuse or trauma.     Current Medications: PNV   Allergies: Denies allergies to medications, food, or environmental allergies    Objective:      Vitals:    09/27/21 0815   BP: 104/60   Weight: 73.5 kg (162 lb)   Height: 1.803 m (5' 11\")    Physical Exam  Constitutional:       Appearance: Normal appearance. She is normal weight.   HENT:      Head: Normocephalic and atraumatic.      Right Ear: External ear normal.      Left Ear: External ear normal.      Nose: Nose normal.      Mouth/Throat:      Mouth: Mucous membranes are moist.      Pharynx: Oropharynx is clear.   Eyes:      Extraocular Movements: Extraocular movements intact.      Conjunctiva/sclera: Conjunctivae normal.      Pupils: Pupils are equal, round, and reactive to light.   Cardiovascular:      Rate and Rhythm: Normal rate and regular rhythm.      Pulses: Normal pulses.      Heart sounds: Normal heart sounds.   Pulmonary:      Effort: Pulmonary effort is normal.      Breath sounds: Normal breath sounds.   Abdominal:      General: Abdomen is flat.      Palpations: Abdomen is soft.   Genitourinary:     General: " Normal vulva.      Rectum: Normal.   Musculoskeletal:         General: Normal range of motion.      Cervical back: Normal range of motion and neck supple.   Skin:     General: Skin is warm and dry.      Capillary Refill: Capillary refill takes less than 2 seconds.   Neurological:      General: No focal deficit present.      Mental Status: She is alert and oriented to person, place, and time. Mental status is at baseline.   Psychiatric:         Mood and Affect: Mood normal.         Behavior: Behavior normal.         Thought Content: Thought content normal.         Judgment: Judgment normal.           See Prenatal Physical and Prenatal Vitals  UA WNL today      Assessment:      1.  IUP @ 20w5d per US      2.  S=D      3.  See problem list as follows      4. History of anxiety and depression       Patient Active Problem List    Diagnosis Date Noted   • JAMES (generalized anxiety disorder) 12/20/2019   • Panic attacks 12/20/2019   • Severe episode of recurrent major depressive disorder, without psychotic features (HCC) 12/20/2019   • Circadian rhythm sleep disorder, shift work type 12/20/2019   • Postpartum care and examination of lactating mother 01/15/2019   • Anxiety and depression 11/14/2014   • Motor tic disorder 08/24/2012         Plan:   - GC/CT & pap done today  - Discussed anxiety and depression, patient declined counseling or medication. Will let us know if she needs any in the future.  - Prenatal labs ordered - lab slip given  - Discussed PNV, nutrition, adequate water intake, and exercise/weight gain in pregnancy  - NOB informational packet with anticipatory guidance given  - S/sx of pregnancy warning signs and PTL precautions given  - Complete OB US in first available   - Return to [facility] in 4 wks

## 2021-09-27 NOTE — PROGRESS NOTES
NOB today. DUB 9/21/21  Last pap: fist one today  Phone # [phone]  Pharmacy confirmed  On PNV  Cystic Fibrosis test offered.  (+) FM, denies VB, or pain. No problems today  AFP pended  Flu vaccine given. L Deltoid. Screening checklist reviewed with patient. VIS given. Verified by KDT

## 2021-09-28 ENCOUNTER — HOSPITAL ENCOUNTER (OUTPATIENT)
Dept: LAB | Facility: MEDICAL CENTER | Age: 21
End: 2021-09-28
Attending: NURSE PRACTITIONER
Payer: COMMERCIAL

## 2021-09-28 DIAGNOSIS — Z34.92 SUPERVISION OF LOW-RISK PREGNANCY, SECOND TRIMESTER: ICD-10-CM

## 2021-09-28 LAB
ABO GROUP BLD: NORMAL
APPEARANCE UR: CLEAR
BACTERIA #/AREA URNS HPF: NEGATIVE /HPF
BASOPHILS # BLD AUTO: 0.2 % (ref 0–1.8)
BASOPHILS # BLD: 0.02 K/UL (ref 0–0.12)
BILIRUB UR QL STRIP.AUTO: NEGATIVE
BLD GP AB SCN SERPL QL: NORMAL
C TRACH DNA GENITAL QL NAA+PROBE: NEGATIVE
COLOR UR: ABNORMAL
CYTOLOGY REG CYTOL: NORMAL
EOSINOPHIL # BLD AUTO: 0.03 K/UL (ref 0–0.51)
EOSINOPHIL NFR BLD: 0.3 % (ref 0–6.9)
EPI CELLS #/AREA URNS HPF: NORMAL /HPF
ERYTHROCYTE [DISTWIDTH] IN BLOOD BY AUTOMATED COUNT: 41.6 FL (ref 35.9–50)
GLUCOSE UR STRIP.AUTO-MCNC: NEGATIVE MG/DL
HBV SURFACE AG SER QL: ABNORMAL
HCT VFR BLD AUTO: 36.3 % (ref 37–47)
HCV AB SER QL: NORMAL
HGB BLD-MCNC: 12.6 G/DL (ref 12–16)
HIV 1+2 AB+HIV1 P24 AG SERPL QL IA: NORMAL
HYALINE CASTS #/AREA URNS LPF: NORMAL /LPF
IMM GRANULOCYTES # BLD AUTO: 0.08 K/UL (ref 0–0.11)
IMM GRANULOCYTES NFR BLD AUTO: 0.7 % (ref 0–0.9)
KETONES UR STRIP.AUTO-MCNC: NEGATIVE MG/DL
LEUKOCYTE ESTERASE UR QL STRIP.AUTO: ABNORMAL
LYMPHOCYTES # BLD AUTO: 1.02 K/UL (ref 1–4.8)
LYMPHOCYTES NFR BLD: 9.1 % (ref 22–41)
MCH RBC QN AUTO: 31.3 PG (ref 27–33)
MCHC RBC AUTO-ENTMCNC: 34.7 G/DL (ref 33.6–35)
MCV RBC AUTO: 90.1 FL (ref 81.4–97.8)
MICRO URNS: ABNORMAL
MONOCYTES # BLD AUTO: 0.54 K/UL (ref 0–0.85)
MONOCYTES NFR BLD AUTO: 4.8 % (ref 0–13.4)
N GONORRHOEA DNA GENITAL QL NAA+PROBE: NEGATIVE
NEUTROPHILS # BLD AUTO: 9.56 K/UL (ref 2–7.15)
NEUTROPHILS NFR BLD: 84.9 % (ref 44–72)
NITRITE UR QL STRIP.AUTO: NEGATIVE
NRBC # BLD AUTO: 0 K/UL
NRBC BLD-RTO: 0 /100 WBC
PH UR STRIP.AUTO: 6.5 [PH] (ref 5–8)
PLATELET # BLD AUTO: 182 K/UL (ref 164–446)
PMV BLD AUTO: 10.8 FL (ref 9–12.9)
PROT UR QL STRIP: NEGATIVE MG/DL
RBC # BLD AUTO: 4.03 M/UL (ref 4.2–5.4)
RBC # URNS HPF: NORMAL /HPF
RBC UR QL AUTO: NEGATIVE
RH BLD: NORMAL
RUBV AB SER QL: 28.4 IU/ML
SP GR UR STRIP.AUTO: 1.02
SPECIMEN SOURCE: NORMAL
TREPONEMA PALLIDUM IGG+IGM AB [PRESENCE] IN SERUM OR PLASMA BY IMMUNOASSAY: ABNORMAL
UROBILINOGEN UR STRIP.AUTO-MCNC: 0.2 MG/DL
WBC # BLD AUTO: 11.3 K/UL (ref 4.8–10.8)
WBC #/AREA URNS HPF: NORMAL /HPF

## 2021-09-28 PROCEDURE — 85025 COMPLETE CBC W/AUTO DIFF WBC: CPT

## 2021-09-28 PROCEDURE — 86900 BLOOD TYPING SEROLOGIC ABO: CPT

## 2021-09-28 PROCEDURE — 86850 RBC ANTIBODY SCREEN: CPT

## 2021-09-28 PROCEDURE — 87389 HIV-1 AG W/HIV-1&-2 AB AG IA: CPT

## 2021-09-28 PROCEDURE — 86762 RUBELLA ANTIBODY: CPT

## 2021-09-28 PROCEDURE — 81001 URINALYSIS AUTO W/SCOPE: CPT | Mod: XU

## 2021-09-28 PROCEDURE — 86901 BLOOD TYPING SEROLOGIC RH(D): CPT

## 2021-09-28 PROCEDURE — 86803 HEPATITIS C AB TEST: CPT

## 2021-09-28 PROCEDURE — 86780 TREPONEMA PALLIDUM: CPT

## 2021-09-28 PROCEDURE — 81511 FTL CGEN ABNOR FOUR ANAL: CPT

## 2021-09-28 PROCEDURE — 80307 DRUG TEST PRSMV CHEM ANLYZR: CPT

## 2021-09-28 PROCEDURE — 87340 HEPATITIS B SURFACE AG IA: CPT

## 2021-09-28 PROCEDURE — 36415 COLL VENOUS BLD VENIPUNCTURE: CPT

## 2021-09-29 LAB
AMPHET CTO UR CFM-MCNC: NEGATIVE NG/ML
BARBITURATES CTO UR CFM-MCNC: NEGATIVE NG/ML
BENZODIAZ CTO UR CFM-MCNC: NEGATIVE NG/ML
CANNABINOIDS CTO UR CFM-MCNC: NEGATIVE NG/ML
COCAINE CTO UR CFM-MCNC: NEGATIVE NG/ML
DRUG COMMENT 753798: NORMAL
METHADONE CTO UR CFM-MCNC: NEGATIVE NG/ML
OPIATES CTO UR CFM-MCNC: NEGATIVE NG/ML
PCP CTO UR CFM-MCNC: NEGATIVE NG/ML
PROPOXYPH CTO UR CFM-MCNC: NEGATIVE NG/ML

## 2021-10-01 LAB
# FETUSES US: NORMAL
AFP MOM SERPL: 1.33
AFP SERPL-MCNC: 86 NG/ML
AGE - REPORTED: 23.5 YR
CURRENT SMOKER: NO
FAMILY MEMBER DISEASES HX: NO
GA METHOD: NORMAL
GA: NORMAL WK
HCG MOM SERPL: 1.19
HCG SERPL-ACNC: NORMAL IU/L
HX OF HEREDITARY DISORDERS: NO
IDDM PATIENT QL: NO
INHIBIN A MOM SERPL: 0.87
INHIBIN A SERPL-MCNC: 146 PG/ML
INTEGRATED SCN PATIENT-IMP: NORMAL
PATHOLOGY STUDY: NORMAL
SPECIMEN DRAWN SERPL: NORMAL
U ESTRIOL MOM SERPL: 0.99
U ESTRIOL SERPL-MCNC: 2.69 NG/ML

## 2021-10-04 ENCOUNTER — APPOINTMENT (OUTPATIENT)
Dept: RADIOLOGY | Facility: IMAGING CENTER | Age: 21
End: 2021-10-04
Attending: NURSE PRACTITIONER
Payer: COMMERCIAL

## 2021-10-04 DIAGNOSIS — Z34.92 SUPERVISION OF LOW-RISK PREGNANCY, SECOND TRIMESTER: ICD-10-CM

## 2021-10-04 PROCEDURE — 76805 OB US >/= 14 WKS SNGL FETUS: CPT | Mod: TC | Performed by: NURSE PRACTITIONER

## 2021-10-25 ENCOUNTER — ROUTINE PRENATAL (OUTPATIENT)
Dept: OBGYN | Facility: CLINIC | Age: 21
End: 2021-10-25
Payer: COMMERCIAL

## 2021-10-25 VITALS — BODY MASS INDEX: 23.29 KG/M2 | WEIGHT: 167 LBS | DIASTOLIC BLOOD PRESSURE: 66 MMHG | SYSTOLIC BLOOD PRESSURE: 106 MMHG

## 2021-10-25 DIAGNOSIS — Z34.82 ENCOUNTER FOR SUPERVISION OF OTHER NORMAL PREGNANCY IN SECOND TRIMESTER: ICD-10-CM

## 2021-10-25 DIAGNOSIS — Z34.80 SUPERVISION OF OTHER NORMAL PREGNANCY, ANTEPARTUM: ICD-10-CM

## 2021-10-25 PROCEDURE — 90040 PR PRENATAL FOLLOW UP: CPT | Performed by: NURSE PRACTITIONER

## 2021-10-25 NOTE — PROGRESS NOTES
OB follow up   + fetal movement.  No VB, LOF or UC's.  Pt states she is having [place] [facility]   [phone] (home)   Preferred pharmacy confirmed.

## 2021-10-25 NOTE — PROGRESS NOTES
SUBJECTIVE:  Pt is a 21 y.o.   at 24w5d  gestation. Presents today for follow-up prenatal care. Reports no issues at this time.  Reports fetal movement. Denies regular cramping/contractions, bleeding or leaking of fluid. Denies dysuria, headaches, N/V. Generally feels well today except some micah davis that happen throughout the day but aren't painful. Reports she does not drink a gallon of water per day. Reports her moods are stable without medication and does not desire any intervention at this time.     OBJECTIVE:  - See prenatal vitals flow  -   Vitals:    10/25/21 0801   BP: 106/66   Weight: 75.8 kg (167 lb)                 ASSESSMENT:   - IUP at 24w5d    - S=D   -   Patient Active Problem List    Diagnosis Date Noted   • JAMES (generalized anxiety disorder) 2019   • Panic attacks 2019   • Severe episode of recurrent major depressive disorder, without psychotic features (HCC) 2019   • Circadian rhythm sleep disorder, shift work type 2019   • Motor tic disorder 2012         PLAN:  - S/sx pregnancy and labor warning signs vs general discomforts discussed  - Fetal movements and/or kick counts reviewed   - Adequate hydration reinforced  - Nutrition/exercise/vitamin education; continue PNV  - S/p flu vacc   - Reviewed COVID-19 vaccination recommendations: pt reports she is unsure about getting it, was thinking about getting it after pregnancy   - Third tr labs given   - Anticipatory guidance given  - RTC in 4 weeks for follow-up prenatal care

## 2021-12-02 ENCOUNTER — ROUTINE PRENATAL (OUTPATIENT)
Dept: OBGYN | Facility: CLINIC | Age: 21
End: 2021-12-02
Payer: COMMERCIAL

## 2021-12-02 VITALS — BODY MASS INDEX: 25.04 KG/M2 | WEIGHT: 179.5 LBS | SYSTOLIC BLOOD PRESSURE: 102 MMHG | DIASTOLIC BLOOD PRESSURE: 60 MMHG

## 2021-12-02 DIAGNOSIS — Z34.83 ENCOUNTER FOR SUPERVISION OF OTHER NORMAL PREGNANCY IN THIRD TRIMESTER: Primary | ICD-10-CM

## 2021-12-02 PROCEDURE — 90040 PR PRENATAL FOLLOW UP: CPT | Performed by: NURSE PRACTITIONER

## 2021-12-02 NOTE — NON-PROVIDER
Pt. Here for OB/FU. Reports Good FM.   Good # [phone]  Pt. Denies VB, LOF, or UC's.   Pharmacy verified.   Chaperone offered and not indicated  Patient states that she has been passing out. It has happened 3 times already.   KENNEDY Sheet given today  Please ask patient for Tdap as she walked out before receiving it.

## 2021-12-02 NOTE — PROGRESS NOTES
Pt reports fainting 3 times since last visit. She reports this has occurred when she stood up, when she bend down at work and once when she was sitting. Discussed importance of slowing positional changes, drinking water. She has not gotten her 3rd trimester labs done, will plan to do this Friday. She desires TDAP, but left before she recd. Will give at nest visit

## 2021-12-02 NOTE — LETTER
"Count Your Baby's Movements  Another step to a healthy delivery    A Epic Dress Re Test             Dept: [phone]    How Many Weeks Pregnant? 30w1d    Date to Begin Countin21              How to use this chart    One way for your physician to keep track of your baby's health is by knowing how often the baby moves (or \"kicks\") in your womb.  You can help your physician to do this by using this chart every day.    Every day, you should see how many hours it takes for your baby to move 10 times.  Start in the morning, as soon as you get up.    · First, write down the time your baby moves until you get to 10.  · Check off one box every time your baby moves until you get to 10.  · Write down the time you finished counting in the last column.  · Total how long it took to count up all 10 movements.  · Finally, fill in the box that shows how long this took.  After counting 10 movements, you no longer have to count any more that day.  The next morning, just start counting again as soon as you get up.    What should you call a \"movement\"?  It is hard to say, because it will feel different from one mother to another and from one pregnancy to the next.  The important thing is that you count the movements the same way throughout your pregnancy.  If you have more questions, you should ask your physician.    Count carefully every day!  SAMPLE:  Week 28    How many hours did it take to feel 10 movements?       Start  Time     1     2     3     4     5     6     7     8     9     10   Finish Time   Mon 8:20 ·  ·  ·  ·  ·  ·  ·  ·  ·  ·  11:40                  Sat               Sun                 IMPORTANT: You should contact your physician if it takes more than two hours for you to feel 10 movements.  Each morning, write down the time and start to count the movements of your baby.  Keep track by checking off one box every time you feel one movement.  When you " "have felt 10 \"kicks\", write down the time you finished counting in the last column.  Then fill in the   box (over the check roni) for the number of hours it took.  Be sure to read the complete instructions on the previous page.            "

## 2021-12-31 ENCOUNTER — HOSPITAL ENCOUNTER (OUTPATIENT)
Dept: LAB | Facility: MEDICAL CENTER | Age: 21
End: 2021-12-31
Attending: NURSE PRACTITIONER
Payer: COMMERCIAL

## 2021-12-31 DIAGNOSIS — Z34.82 ENCOUNTER FOR SUPERVISION OF OTHER NORMAL PREGNANCY IN SECOND TRIMESTER: ICD-10-CM

## 2021-12-31 LAB
ERYTHROCYTE [DISTWIDTH] IN BLOOD BY AUTOMATED COUNT: 40.5 FL (ref 35.9–50)
GLUCOSE 1H P 50 G GLC PO SERPL-MCNC: 93 MG/DL (ref 70–139)
HCT VFR BLD AUTO: 36 % (ref 37–47)
HGB BLD-MCNC: 12.2 G/DL (ref 12–16)
MCH RBC QN AUTO: 29.4 PG (ref 27–33)
MCHC RBC AUTO-ENTMCNC: 33.9 G/DL (ref 33.6–35)
MCV RBC AUTO: 86.7 FL (ref 81.4–97.8)
PLATELET # BLD AUTO: 207 K/UL (ref 164–446)
PMV BLD AUTO: 10.8 FL (ref 9–12.9)
RBC # BLD AUTO: 4.15 M/UL (ref 4.2–5.4)
TREPONEMA PALLIDUM IGG+IGM AB [PRESENCE] IN SERUM OR PLASMA BY IMMUNOASSAY: NORMAL
WBC # BLD AUTO: 10 K/UL (ref 4.8–10.8)

## 2021-12-31 PROCEDURE — 36415 COLL VENOUS BLD VENIPUNCTURE: CPT

## 2021-12-31 PROCEDURE — 86780 TREPONEMA PALLIDUM: CPT

## 2021-12-31 PROCEDURE — 82950 GLUCOSE TEST: CPT

## 2021-12-31 PROCEDURE — 85027 COMPLETE CBC AUTOMATED: CPT

## 2022-01-07 ENCOUNTER — ROUTINE PRENATAL (OUTPATIENT)
Dept: OBGYN | Facility: CLINIC | Age: 22
End: 2022-01-07
Payer: COMMERCIAL

## 2022-01-07 VITALS — SYSTOLIC BLOOD PRESSURE: 104 MMHG | WEIGHT: 185 LBS | DIASTOLIC BLOOD PRESSURE: 64 MMHG | BODY MASS INDEX: 25.8 KG/M2

## 2022-01-07 DIAGNOSIS — Z34.80 SUPERVISION OF OTHER NORMAL PREGNANCY, ANTEPARTUM: Primary | ICD-10-CM

## 2022-01-07 PROCEDURE — 90471 IMMUNIZATION ADMIN: CPT | Performed by: NURSE PRACTITIONER

## 2022-01-07 PROCEDURE — 90040 PR PRENATAL FOLLOW UP: CPT | Performed by: NURSE PRACTITIONER

## 2022-01-07 PROCEDURE — 90715 TDAP VACCINE 7 YRS/> IM: CPT | Performed by: NURSE PRACTITIONER

## 2022-01-07 NOTE — PROGRESS NOTES
S:  Reports a very active baby.  Also reports some vaginal spotting yesterday that has since resolved.  Reports good FM.  Denies VB, LOF, RUCs or vaginal DC.      O:    Vitals:    01/07/22 0909   BP: 104/64   Weight: 83.9 kg (185 lb)     See flow sheet.    A:  IUP at 35w2d  Patient Active Problem List    Diagnosis Date Noted   • Supervision of other normal pregnancy, antepartum 10/25/2021   • JAMES (generalized anxiety disorder) 12/20/2019   • Panic attacks 12/20/2019   • Severe episode of recurrent major depressive disorder, without psychotic features (HCC) 12/20/2019   • Motor tic disorder 08/24/2012        P:  1.  GBS @ 36 wks.          2.  Continue FKCs.          3.  Questions answered.          4.  L&D policies reviewed w pt.        5.  Encourage adequate water intake.        6.  F/u 1 wks.        7.  Tdap given.    I have placed the below orders and discussed them with an approved delegating provider. The MA is performing the below orders under the direction of  Dr. Cobos.

## 2022-01-07 NOTE — PROGRESS NOTES
OB follow up   + fetal movement.  No VB, LOF or UC's.  Phone # [phone]  Preferred pharmacy confirmed.  TDap given to patient. R   Deltoid. Screening checklist reviewed with patient. VIS given. Verified by TEMO

## 2022-01-18 ENCOUNTER — HOSPITAL ENCOUNTER (OUTPATIENT)
Facility: MEDICAL CENTER | Age: 22
End: 2022-01-18
Attending: PHYSICIAN ASSISTANT
Payer: COMMERCIAL

## 2022-01-18 ENCOUNTER — ROUTINE PRENATAL (OUTPATIENT)
Dept: OBGYN | Facility: CLINIC | Age: 22
End: 2022-01-18
Payer: COMMERCIAL

## 2022-01-18 VITALS — BODY MASS INDEX: 26.36 KG/M2 | WEIGHT: 189 LBS | SYSTOLIC BLOOD PRESSURE: 100 MMHG | DIASTOLIC BLOOD PRESSURE: 58 MMHG

## 2022-01-18 DIAGNOSIS — Z34.80 SUPERVISION OF OTHER NORMAL PREGNANCY, ANTEPARTUM: ICD-10-CM

## 2022-01-18 PROCEDURE — 87081 CULTURE SCREEN ONLY: CPT

## 2022-01-18 PROCEDURE — 87150 DNA/RNA AMPLIFIED PROBE: CPT

## 2022-01-18 PROCEDURE — 90040 PR PRENATAL FOLLOW UP: CPT | Performed by: PHYSICIAN ASSISTANT

## 2022-01-18 NOTE — PROGRESS NOTES
Pt here today for OB follow up  GBS to be done today  Reports +FM  WT: 189 lb  BP: 100/58  Preferred pharmacy verified with pt.  Pt states no complaints or concerns today  Good # [phone]

## 2022-01-18 NOTE — PROGRESS NOTES
Pt has no complaints with cramping, regular or strong UCs, Vb, LOF. +FM. GBS done today. Labor precautions reviewed. Daily FKC recommended. Cervix: Ft/50/-3, vtx, soft. RTC 1 wk or sooner prn.

## 2022-01-19 LAB — GP B STREP DNA SPEC QL NAA+PROBE: NEGATIVE

## 2022-01-24 ENCOUNTER — HOSPITAL ENCOUNTER (OUTPATIENT)
Facility: MEDICAL CENTER | Age: 22
End: 2022-01-24
Attending: OBSTETRICS & GYNECOLOGY | Admitting: OBSTETRICS & GYNECOLOGY
Payer: COMMERCIAL

## 2022-01-24 ENCOUNTER — TELEPHONE (OUTPATIENT)
Dept: OBGYN | Facility: CLINIC | Age: 22
End: 2022-01-24

## 2022-01-24 ENCOUNTER — APPOINTMENT (OUTPATIENT)
Dept: RADIOLOGY | Facility: MEDICAL CENTER | Age: 22
End: 2022-01-24
Attending: OBSTETRICS & GYNECOLOGY
Payer: COMMERCIAL

## 2022-01-24 VITALS
BODY MASS INDEX: 26.46 KG/M2 | HEART RATE: 90 BPM | HEIGHT: 71 IN | WEIGHT: 189 LBS | DIASTOLIC BLOOD PRESSURE: 62 MMHG | SYSTOLIC BLOOD PRESSURE: 110 MMHG | TEMPERATURE: 97.6 F | RESPIRATION RATE: 18 BRPM

## 2022-01-24 PROCEDURE — 59025 FETAL NON-STRESS TEST: CPT | Mod: XU

## 2022-01-24 PROCEDURE — 76819 FETAL BIOPHYS PROFIL W/O NST: CPT

## 2022-01-24 ASSESSMENT — PAIN SCALES - GENERAL: PAINLEVEL: 0 - NO PAIN

## 2022-01-24 NOTE — TELEPHONE ENCOUNTER
Patient called stating is 38 weeks pregnant and has not felt baby move at all since last night.     Patient states tried about everything from cold water, good meal, and laying on left side but no movement.     Pt denies any VB/LOF     Advised patient to head to L&D to be further evaluated.     Pt understood no further questions asked.

## 2022-01-27 ENCOUNTER — ROUTINE PRENATAL (OUTPATIENT)
Dept: OBGYN | Facility: CLINIC | Age: 22
End: 2022-01-27
Payer: COMMERCIAL

## 2022-01-27 VITALS — WEIGHT: 191 LBS | BODY MASS INDEX: 26.64 KG/M2 | DIASTOLIC BLOOD PRESSURE: 66 MMHG | SYSTOLIC BLOOD PRESSURE: 112 MMHG

## 2022-01-27 DIAGNOSIS — Z34.83 ENCOUNTER FOR SUPERVISION OF OTHER NORMAL PREGNANCY IN THIRD TRIMESTER: ICD-10-CM

## 2022-01-27 PROCEDURE — 90040 PR PRENATAL FOLLOW UP: CPT | Performed by: NURSE PRACTITIONER

## 2022-01-27 NOTE — PROGRESS NOTES
Pt here today for OB follow up  Negative GBS  Reports +FM  WT: 191 lb  BP: 112/66  Preferred pharmacy verified with pt.  Pt states no complaints or concerns today  Good # [phone]

## 2022-01-27 NOTE — PROGRESS NOTES
SUBJECTIVE:  Pt is a 21 y.o.   at 38w1d  gestation. Presents today for follow-up prenatal care. Reports no issues at this time.  Reports good fetal movement. Denies regular cramping/contractions, bleeding or leaking of fluid. Denies dysuria, headaches, N/V. Generally feels well today.     OBJECTIVE:  - See prenatal vitals flow  -   Vitals:    22 1429   BP: 112/66   Weight: 86.6 kg (191 lb)                 ASSESSMENT:   - IUP at 38w1d    - S=D   -   Patient Active Problem List    Diagnosis Date Noted   • Supervision of other normal pregnancy, antepartum 10/25/2021   • JAMES (generalized anxiety disorder) 2019   • Panic attacks 2019   • Severe episode of recurrent major depressive disorder, without psychotic features (HCC) 2019   • Motor tic disorder 2012         PLAN:  - S/sx pregnancy and labor warning signs vs general discomforts discussed  - Fetal movements and/or kick counts reviewed   - Adequate hydration reinforced  - Nutrition/exercise/vitamin education; continue PNV  - IOL desired for 41 weeks  - Anticipatory guidance given  - RTC in 1 weeks for follow-up prenatal care

## 2022-02-03 ENCOUNTER — ROUTINE PRENATAL (OUTPATIENT)
Dept: OBGYN | Facility: CLINIC | Age: 22
End: 2022-02-03
Payer: COMMERCIAL

## 2022-02-03 VITALS — WEIGHT: 194 LBS | BODY MASS INDEX: 27.06 KG/M2 | DIASTOLIC BLOOD PRESSURE: 60 MMHG | SYSTOLIC BLOOD PRESSURE: 110 MMHG

## 2022-02-03 DIAGNOSIS — Z34.80 SUPERVISION OF OTHER NORMAL PREGNANCY, ANTEPARTUM: ICD-10-CM

## 2022-02-03 PROCEDURE — 90040 PR PRENATAL FOLLOW UP: CPT | Performed by: NURSE PRACTITIONER

## 2022-02-03 NOTE — PROGRESS NOTES
SUBJECTIVE:  Pt is a 21 y.o.   at 39w1d  gestation. Presents today for follow-up prenatal care. Reports no issues at this time.  Reports good fetal movement. Denies regular cramping/contractions, bleeding or leaking of fluid. Denies dysuria, headaches, N/V. Generally feels well today except irregular contractions.     OBJECTIVE:  - See prenatal vitals flow  -   Vitals:    22 1016   BP: 110/60   Weight: 88 kg (194 lb)                 ASSESSMENT:   - IUP at 39w1d    - S=D   -   Patient Active Problem List    Diagnosis Date Noted   • Supervision of other normal pregnancy, antepartum 10/25/2021   • JAMES (generalized anxiety disorder) 2019   • Panic attacks 2019   • Severe episode of recurrent major depressive disorder, without psychotic features (HCC) 2019   • Motor tic disorder 2012         PLAN:  - S/sx pregnancy and labor warning signs vs general discomforts discussed  - Fetal movements and/or kick counts reviewed   - Adequate hydration reinforced  - Nutrition/exercise/vitamin education; continue PNV  - IOL   - Anticipatory guidance given  - RTC in 1 weeks for follow-up prenatal care

## 2022-02-03 NOTE — PROGRESS NOTES
OB follow up   + fetal movement.  No VB or LOF  Pt states she is having irregular UC's  [phone] (home)   Preferred pharmacy confirmed.  GBS Negative  IOL information given today

## 2022-02-11 ENCOUNTER — ROUTINE PRENATAL (OUTPATIENT)
Dept: OBGYN | Facility: CLINIC | Age: 22
End: 2022-02-11
Payer: COMMERCIAL

## 2022-02-11 VITALS — WEIGHT: 196.6 LBS | SYSTOLIC BLOOD PRESSURE: 112 MMHG | DIASTOLIC BLOOD PRESSURE: 64 MMHG | BODY MASS INDEX: 27.42 KG/M2

## 2022-02-11 DIAGNOSIS — O36.8130 DECREASED FETAL MOVEMENTS IN THIRD TRIMESTER, SINGLE OR UNSPECIFIED FETUS: ICD-10-CM

## 2022-02-11 LAB
NST ACOUSTIC STIMULATION: NORMAL
NST ACTION NECESSARY: NORMAL
NST ASSESSMENT: NORMAL
NST BASELINE: NORMAL
NST INDICATIONS: NORMAL
NST OTHER DATA: NORMAL
NST READ BY: NORMAL
NST RETURN: NORMAL
NST UTERINE ACTIVITY: NORMAL

## 2022-02-11 PROCEDURE — 90040 PR PRENATAL FOLLOW UP: CPT | Performed by: NURSE PRACTITIONER

## 2022-02-11 NOTE — PROGRESS NOTES
SUBJECTIVE:  Pt is a 21 y.o.   at 40w2d  gestation. Presents today for follow-up prenatal care. Reports no issues at this time.  Reports decreased fetal movement for past two days, not getting her counts and has to push on her belly to get baby to move. Denies regular cramping/contractions, bleeding or leaking of fluid. Denies dysuria, headaches, N/V. Generally feels well today. She is wondering about moving up her induction.     OBJECTIVE:  - See prenatal vitals flow  -   Vitals:    22 0814   BP: 112/64   Weight: 89.2 kg (196 lb 9.6 oz)                 ASSESSMENT:   - IUP at 40w2d    - S=D   -   Patient Active Problem List    Diagnosis Date Noted   • Supervision of other normal pregnancy, antepartum 10/25/2021   • JAMES (generalized anxiety disorder) 2019   • Panic attacks 2019   • Severe episode of recurrent major depressive disorder, without psychotic features (HCC) 2019   • Motor tic disorder 2012         PLAN:  - S/sx pregnancy and labor warning signs vs general discomforts discussed  - Fetal movements and/or kick counts reviewed   - Adequate hydration reinforced  - Nutrition/exercise/vitamin education; continue PNV  - Desires IOL to be moved up: reschedule for   - S/p Tdap and flu vacc   - NST now for decreased FM: do to kick counts and report to LnD if still issues with movements/getting counts   - Anticipatory guidance given  - RTC PRN

## 2022-02-11 NOTE — NON-PROVIDER
Pt. Here for OB/FU. Reports Good FM.   Good # [phone]  Pt. Denies VB, LOF, or UC's.   Pharmacy verified.   Chaperone offered and not indicated  Patient states no cocnerns at the moment.   Patient is scheduled for IOL on 2/16/22 at 9am patient is aware.

## 2022-02-13 ENCOUNTER — HOSPITAL ENCOUNTER (INPATIENT)
Facility: MEDICAL CENTER | Age: 22
LOS: 2 days | End: 2022-02-15
Attending: OBSTETRICS & GYNECOLOGY | Admitting: OBSTETRICS & GYNECOLOGY
Payer: COMMERCIAL

## 2022-02-13 ENCOUNTER — ANESTHESIA (OUTPATIENT)
Dept: ANESTHESIOLOGY | Facility: MEDICAL CENTER | Age: 22
End: 2022-02-13
Payer: COMMERCIAL

## 2022-02-13 ENCOUNTER — ANESTHESIA EVENT (OUTPATIENT)
Dept: ANESTHESIOLOGY | Facility: MEDICAL CENTER | Age: 22
End: 2022-02-13
Payer: COMMERCIAL

## 2022-02-13 LAB
BASOPHILS # BLD AUTO: 0.3 % (ref 0–1.8)
BASOPHILS # BLD: 0.03 K/UL (ref 0–0.12)
EOSINOPHIL # BLD AUTO: 0.06 K/UL (ref 0–0.51)
EOSINOPHIL NFR BLD: 0.6 % (ref 0–6.9)
ERYTHROCYTE [DISTWIDTH] IN BLOOD BY AUTOMATED COUNT: 39.4 FL (ref 35.9–50)
HCT VFR BLD AUTO: 34.7 % (ref 37–47)
HGB BLD-MCNC: 11.3 G/DL (ref 12–16)
HOLDING TUBE BB 8507: NORMAL
IMM GRANULOCYTES # BLD AUTO: 0.08 K/UL (ref 0–0.11)
IMM GRANULOCYTES NFR BLD AUTO: 0.8 % (ref 0–0.9)
LYMPHOCYTES # BLD AUTO: 1.72 K/UL (ref 1–4.8)
LYMPHOCYTES NFR BLD: 17.1 % (ref 22–41)
MCH RBC QN AUTO: 26.2 PG (ref 27–33)
MCHC RBC AUTO-ENTMCNC: 32.6 G/DL (ref 33.6–35)
MCV RBC AUTO: 80.5 FL (ref 81.4–97.8)
MONOCYTES # BLD AUTO: 0.71 K/UL (ref 0–0.85)
MONOCYTES NFR BLD AUTO: 7.1 % (ref 0–13.4)
NEUTROPHILS # BLD AUTO: 7.44 K/UL (ref 2–7.15)
NEUTROPHILS NFR BLD: 74.1 % (ref 44–72)
NRBC # BLD AUTO: 0 K/UL
NRBC BLD-RTO: 0 /100 WBC
PLATELET # BLD AUTO: 181 K/UL (ref 164–446)
PMV BLD AUTO: 11.8 FL (ref 9–12.9)
RBC # BLD AUTO: 4.31 M/UL (ref 4.2–5.4)
SARS-COV+SARS-COV-2 AG RESP QL IA.RAPID: NOTDETECTED
SPECIMEN SOURCE: NORMAL
WBC # BLD AUTO: 10 K/UL (ref 4.8–10.8)

## 2022-02-13 PROCEDURE — A9270 NON-COVERED ITEM OR SERVICE: HCPCS | Performed by: NURSE PRACTITIONER

## 2022-02-13 PROCEDURE — 700102 HCHG RX REV CODE 250 W/ 637 OVERRIDE(OP): Performed by: NURSE PRACTITIONER

## 2022-02-13 PROCEDURE — 59400 OBSTETRICAL CARE: CPT | Performed by: NURSE PRACTITIONER

## 2022-02-13 PROCEDURE — 85025 COMPLETE CBC W/AUTO DIFF WBC: CPT

## 2022-02-13 PROCEDURE — 3E033VJ INTRODUCTION OF OTHER HORMONE INTO PERIPHERAL VEIN, PERCUTANEOUS APPROACH: ICD-10-PCS | Performed by: OBSTETRICS & GYNECOLOGY

## 2022-02-13 PROCEDURE — 36415 COLL VENOUS BLD VENIPUNCTURE: CPT

## 2022-02-13 PROCEDURE — 700111 HCHG RX REV CODE 636 W/ 250 OVERRIDE (IP): Performed by: ANESTHESIOLOGY

## 2022-02-13 PROCEDURE — 87426 SARSCOV CORONAVIRUS AG IA: CPT

## 2022-02-13 PROCEDURE — 700111 HCHG RX REV CODE 636 W/ 250 OVERRIDE (IP): Performed by: OBSTETRICS & GYNECOLOGY

## 2022-02-13 PROCEDURE — 700105 HCHG RX REV CODE 258: Performed by: OBSTETRICS & GYNECOLOGY

## 2022-02-13 PROCEDURE — 304965 HCHG RECOVERY SERVICES

## 2022-02-13 PROCEDURE — 303615 HCHG EPIDURAL/SPINAL ANESTHESIA FOR LABOR

## 2022-02-13 PROCEDURE — 700105 HCHG RX REV CODE 258: Performed by: ANESTHESIOLOGY

## 2022-02-13 PROCEDURE — 700111 HCHG RX REV CODE 636 W/ 250 OVERRIDE (IP)

## 2022-02-13 PROCEDURE — 770002 HCHG ROOM/CARE - OB PRIVATE (112)

## 2022-02-13 PROCEDURE — 59409 OBSTETRICAL CARE: CPT

## 2022-02-13 PROCEDURE — 10907ZC DRAINAGE OF AMNIOTIC FLUID, THERAPEUTIC FROM PRODUCTS OF CONCEPTION, VIA NATURAL OR ARTIFICIAL OPENING: ICD-10-PCS | Performed by: OBSTETRICS & GYNECOLOGY

## 2022-02-13 PROCEDURE — 0HQ9XZZ REPAIR PERINEUM SKIN, EXTERNAL APPROACH: ICD-10-PCS | Performed by: OBSTETRICS & GYNECOLOGY

## 2022-02-13 RX ORDER — IBUPROFEN 800 MG/1
800 TABLET ORAL EVERY 8 HOURS PRN
Status: DISCONTINUED | OUTPATIENT
Start: 2022-02-13 | End: 2022-02-15 | Stop reason: HOSPADM

## 2022-02-13 RX ORDER — ACETAMINOPHEN 500 MG
1000 TABLET ORAL
Status: DISCONTINUED | OUTPATIENT
Start: 2022-02-13 | End: 2022-02-13 | Stop reason: HOSPADM

## 2022-02-13 RX ORDER — MISOPROSTOL 200 UG/1
800 TABLET ORAL
Status: DISCONTINUED | OUTPATIENT
Start: 2022-02-13 | End: 2022-02-13 | Stop reason: HOSPADM

## 2022-02-13 RX ORDER — SODIUM CHLORIDE, SODIUM LACTATE, POTASSIUM CHLORIDE, AND CALCIUM CHLORIDE .6; .31; .03; .02 G/100ML; G/100ML; G/100ML; G/100ML
1000 INJECTION, SOLUTION INTRAVENOUS
Status: COMPLETED | OUTPATIENT
Start: 2022-02-13 | End: 2022-02-14

## 2022-02-13 RX ORDER — BUPIVACAINE HYDROCHLORIDE 2.5 MG/ML
INJECTION, SOLUTION EPIDURAL; INFILTRATION; INTRACAUDAL PRN
Status: DISCONTINUED | OUTPATIENT
Start: 2022-02-13 | End: 2022-02-13 | Stop reason: SURG

## 2022-02-13 RX ORDER — TERBUTALINE SULFATE 1 MG/ML
0.25 INJECTION, SOLUTION SUBCUTANEOUS
Status: DISCONTINUED | OUTPATIENT
Start: 2022-02-13 | End: 2022-02-13 | Stop reason: HOSPADM

## 2022-02-13 RX ORDER — BUPIVACAINE HYDROCHLORIDE 2.5 MG/ML
INJECTION, SOLUTION EPIDURAL; INFILTRATION; INTRACAUDAL
Status: COMPLETED
Start: 2022-02-13 | End: 2022-02-13

## 2022-02-13 RX ORDER — ALUMINA, MAGNESIA, AND SIMETHICONE 2400; 2400; 240 MG/30ML; MG/30ML; MG/30ML
30 SUSPENSION ORAL EVERY 6 HOURS PRN
Status: DISCONTINUED | OUTPATIENT
Start: 2022-02-13 | End: 2022-02-13 | Stop reason: HOSPADM

## 2022-02-13 RX ORDER — MISOPROSTOL 200 UG/1
600 TABLET ORAL
Status: DISCONTINUED | OUTPATIENT
Start: 2022-02-13 | End: 2022-02-15 | Stop reason: HOSPADM

## 2022-02-13 RX ORDER — ROPIVACAINE HYDROCHLORIDE 2 MG/ML
INJECTION, SOLUTION EPIDURAL; INFILTRATION; PERINEURAL
Status: COMPLETED
Start: 2022-02-13 | End: 2022-02-13

## 2022-02-13 RX ORDER — VITAMIN A ACETATE, BETA CAROTENE, ASCORBIC ACID, CHOLECALCIFEROL, .ALPHA.-TOCOPHEROL ACETATE, DL-, THIAMINE MONONITRATE, RIBOFLAVIN, NIACINAMIDE, PYRIDOXINE HYDROCHLORIDE, FOLIC ACID, CYANOCOBALAMIN, CALCIUM CARBONATE, FERROUS FUMARATE, ZINC OXIDE, CUPRIC OXIDE 3080; 12; 120; 400; 1; 1.84; 3; 20; 22; 920; 25; 200; 27; 10; 2 [IU]/1; UG/1; MG/1; [IU]/1; MG/1; MG/1; MG/1; MG/1; MG/1; [IU]/1; MG/1; MG/1; MG/1; MG/1; MG/1
1 TABLET, FILM COATED ORAL
Status: DISCONTINUED | OUTPATIENT
Start: 2022-02-14 | End: 2022-02-15 | Stop reason: HOSPADM

## 2022-02-13 RX ORDER — SODIUM CHLORIDE, SODIUM LACTATE, POTASSIUM CHLORIDE, CALCIUM CHLORIDE 600; 310; 30; 20 MG/100ML; MG/100ML; MG/100ML; MG/100ML
INJECTION, SOLUTION INTRAVENOUS CONTINUOUS
Status: DISCONTINUED | OUTPATIENT
Start: 2022-02-13 | End: 2022-02-13 | Stop reason: HOSPADM

## 2022-02-13 RX ORDER — HYDROXYZINE 50 MG/1
50 TABLET, FILM COATED ORAL EVERY 6 HOURS PRN
Status: DISCONTINUED | OUTPATIENT
Start: 2022-02-13 | End: 2022-02-13 | Stop reason: HOSPADM

## 2022-02-13 RX ORDER — ACETAMINOPHEN 500 MG
1000 TABLET ORAL EVERY 6 HOURS PRN
Status: DISCONTINUED | OUTPATIENT
Start: 2022-02-13 | End: 2022-02-15 | Stop reason: HOSPADM

## 2022-02-13 RX ORDER — SODIUM CHLORIDE, SODIUM LACTATE, POTASSIUM CHLORIDE, AND CALCIUM CHLORIDE .6; .31; .03; .02 G/100ML; G/100ML; G/100ML; G/100ML
250 INJECTION, SOLUTION INTRAVENOUS PRN
Status: DISCONTINUED | OUTPATIENT
Start: 2022-02-13 | End: 2022-02-13 | Stop reason: HOSPADM

## 2022-02-13 RX ORDER — SODIUM CHLORIDE, SODIUM LACTATE, POTASSIUM CHLORIDE, CALCIUM CHLORIDE 600; 310; 30; 20 MG/100ML; MG/100ML; MG/100ML; MG/100ML
INJECTION, SOLUTION INTRAVENOUS PRN
Status: DISCONTINUED | OUTPATIENT
Start: 2022-02-13 | End: 2022-02-15 | Stop reason: HOSPADM

## 2022-02-13 RX ORDER — ONDANSETRON 2 MG/ML
4 INJECTION INTRAMUSCULAR; INTRAVENOUS EVERY 6 HOURS PRN
Status: DISCONTINUED | OUTPATIENT
Start: 2022-02-13 | End: 2022-02-13 | Stop reason: HOSPADM

## 2022-02-13 RX ORDER — DOCUSATE SODIUM 100 MG/1
100 CAPSULE, LIQUID FILLED ORAL 2 TIMES DAILY PRN
Status: DISCONTINUED | OUTPATIENT
Start: 2022-02-13 | End: 2022-02-15 | Stop reason: HOSPADM

## 2022-02-13 RX ORDER — ONDANSETRON 4 MG/1
4 TABLET, ORALLY DISINTEGRATING ORAL EVERY 6 HOURS PRN
Status: DISCONTINUED | OUTPATIENT
Start: 2022-02-13 | End: 2022-02-13 | Stop reason: HOSPADM

## 2022-02-13 RX ORDER — IBUPROFEN 800 MG/1
800 TABLET ORAL
Status: DISCONTINUED | OUTPATIENT
Start: 2022-02-13 | End: 2022-02-13 | Stop reason: HOSPADM

## 2022-02-13 RX ORDER — ROPIVACAINE HYDROCHLORIDE 2 MG/ML
INJECTION, SOLUTION EPIDURAL; INFILTRATION; PERINEURAL CONTINUOUS
Status: DISCONTINUED | OUTPATIENT
Start: 2022-02-13 | End: 2022-02-13 | Stop reason: HOSPADM

## 2022-02-13 RX ORDER — OXYTOCIN 10 [USP'U]/ML
10 INJECTION, SOLUTION INTRAMUSCULAR; INTRAVENOUS
Status: DISCONTINUED | OUTPATIENT
Start: 2022-02-13 | End: 2022-02-13 | Stop reason: HOSPADM

## 2022-02-13 RX ADMIN — SODIUM CHLORIDE, POTASSIUM CHLORIDE, SODIUM LACTATE AND CALCIUM CHLORIDE 1000 ML: 600; 310; 30; 20 INJECTION, SOLUTION INTRAVENOUS at 16:20

## 2022-02-13 RX ADMIN — BUPIVACAINE HYDROCHLORIDE 2.5 ML: 2.5 INJECTION, SOLUTION EPIDURAL; INFILTRATION; INTRACAUDAL; PERINEURAL at 16:33

## 2022-02-13 RX ADMIN — Medication 2 MILLI-UNITS/MIN: at 10:30

## 2022-02-13 RX ADMIN — FENTANYL CITRATE 50 MCG: 50 INJECTION, SOLUTION INTRAMUSCULAR; INTRAVENOUS at 16:33

## 2022-02-13 RX ADMIN — SODIUM CHLORIDE, POTASSIUM CHLORIDE, SODIUM LACTATE AND CALCIUM CHLORIDE: 600; 310; 30; 20 INJECTION, SOLUTION INTRAVENOUS at 10:30

## 2022-02-13 RX ADMIN — ACETAMINOPHEN 1000 MG: 500 TABLET ORAL at 21:39

## 2022-02-13 RX ADMIN — ROPIVACAINE HYDROCHLORIDE: 2 INJECTION, SOLUTION EPIDURAL; INFILTRATION; PERINEURAL at 16:39

## 2022-02-13 RX ADMIN — FENTANYL CITRATE 50 MCG: 50 INJECTION, SOLUTION INTRAMUSCULAR; INTRAVENOUS at 16:38

## 2022-02-13 RX ADMIN — SODIUM CHLORIDE, POTASSIUM CHLORIDE, SODIUM LACTATE AND CALCIUM CHLORIDE: 600; 310; 30; 20 INJECTION, SOLUTION INTRAVENOUS at 10:45

## 2022-02-13 RX ADMIN — OXYTOCIN 2000 ML/HR: 10 INJECTION, SOLUTION INTRAMUSCULAR; INTRAVENOUS at 19:24

## 2022-02-13 RX ADMIN — BUPIVACAINE HYDROCHLORIDE 2.5 ML: 2.5 INJECTION, SOLUTION EPIDURAL; INFILTRATION; INTRACAUDAL; PERINEURAL at 16:38

## 2022-02-13 RX ADMIN — ROPIVACAINE HYDROCHLORIDE: 2 INJECTION, SOLUTION EPIDURAL; INFILTRATION at 16:39

## 2022-02-13 RX ADMIN — OXYTOCIN 125 ML/HR: 10 INJECTION, SOLUTION INTRAMUSCULAR; INTRAVENOUS at 20:15

## 2022-02-13 ASSESSMENT — LIFESTYLE VARIABLES: EVER_SMOKED: NEVER

## 2022-02-13 ASSESSMENT — PATIENT HEALTH QUESTIONNAIRE - PHQ9
1. LITTLE INTEREST OR PLEASURE IN DOING THINGS: NOT AT ALL
2. FEELING DOWN, DEPRESSED, IRRITABLE, OR HOPELESS: NOT AT ALL
SUM OF ALL RESPONSES TO PHQ9 QUESTIONS 1 AND 2: 0

## 2022-02-13 ASSESSMENT — PAIN SCALES - GENERAL: PAIN_LEVEL: 2

## 2022-02-13 NOTE — H&P
OB H&P:    CC: IOL    HPI:  Ms. Vanessa Chávez is a 21 y.o.  @ 40w4d by LMP c/w 2nd trimester ultrasound. Pt reports that she has not felt any contractions, has no LOF, or vaginal bleeding. Her pregnancy was uncomplicated. GBS -.    Contractions: No   Loss of fluid: No   Vaginal bleeding: No   Fetal movement: present      PNC with [facility]    PNL:  Rh+, RI, HIV neg, TrepAb neg, HBsAg NR, GC/CT negative  Glucola: pass  GBS negative      ROS:  Const: denies fevers, general concerns  CV/resp: reports no concerns  GI: denies abd pain, GI concerns  : see HPI  Neuro: denies HA/vision changes    OB History    Para Term  AB Living   3 1 1   1 1   SAB IAB Ectopic Molar Multiple Live Births   1       0 1      # Outcome Date GA Lbr Nakul/2nd Weight Sex Delivery Anes PTL Lv   3 Current            2 Term 19 41w0d / 02:19 3.95 kg (8 lb 11.3 oz) M Vag-Spont EPI N SHERIDAN   1 SAB 10/01/17     SAB          GYN: denies STIs, no cervical procedures    Past Medical History:   Diagnosis Date   • Tourette's disorder 2012       History reviewed. No pertinent surgical history.    No current facility-administered medications on file prior to encounter.     Current Outpatient Medications on File Prior to Encounter   Medication Sig Dispense Refill   • Prenatal MV-Min-Fe Fum-FA-DHA (PRENATAL 1 PO) Take  by mouth.         Family History   Problem Relation Age of Onset   • Other Mother         IPT   • Anemia Mother    • Psychiatric Illness Brother         tourette movement d/o - resolved   • Arthritis Maternal Grandmother    • Diabetes Maternal Grandmother    • Diabetes Maternal Uncle         T2DM   • No Known Problems Son        Social History     Tobacco Use   • Smoking status: Never Smoker   • Smokeless tobacco: Never Used   Vaping Use   • Vaping Use: Never used   Substance Use Topics   • Alcohol use: No   • Drug use: No         PE:  Vitals:    22 0916   Weight: 90 kg (198 lb 6.6 oz)     gen: AAO, NAD  abd:  soft, gravid, NT, EFW 3100  Ext: NT, No edema    SVE: 3/50/-2 on admission  FHT: 140/moderate variability/+ accels/ no decels  Tami: Irregular, infrequent contractions    A/P: 21 y.o.  @ 40w4d by LMP here for elective IOL. Patient is a multip.     Plan:  - Pit 2 x 2. Will AROM as indicated  - Pt desires epidural          Rubén Yeh M.D.

## 2022-02-14 LAB
ERYTHROCYTE [DISTWIDTH] IN BLOOD BY AUTOMATED COUNT: 39 FL (ref 35.9–50)
HCT VFR BLD AUTO: 34.1 % (ref 37–47)
HGB BLD-MCNC: 11.4 G/DL (ref 12–16)
MCH RBC QN AUTO: 26.7 PG (ref 27–33)
MCHC RBC AUTO-ENTMCNC: 33.4 G/DL (ref 33.6–35)
MCV RBC AUTO: 79.9 FL (ref 81.4–97.8)
PLATELET # BLD AUTO: 174 K/UL (ref 164–446)
PMV BLD AUTO: 11.2 FL (ref 9–12.9)
RBC # BLD AUTO: 4.27 M/UL (ref 4.2–5.4)
WBC # BLD AUTO: 12.6 K/UL (ref 4.8–10.8)

## 2022-02-14 PROCEDURE — A9270 NON-COVERED ITEM OR SERVICE: HCPCS | Performed by: NURSE PRACTITIONER

## 2022-02-14 PROCEDURE — 36415 COLL VENOUS BLD VENIPUNCTURE: CPT

## 2022-02-14 PROCEDURE — 85027 COMPLETE CBC AUTOMATED: CPT

## 2022-02-14 PROCEDURE — 700102 HCHG RX REV CODE 250 W/ 637 OVERRIDE(OP): Performed by: NURSE PRACTITIONER

## 2022-02-14 PROCEDURE — 770002 HCHG ROOM/CARE - OB PRIVATE (112)

## 2022-02-14 RX ADMIN — PRENATAL WITH FERROUS FUM AND FOLIC ACID 1 TABLET: 3080; 920; 120; 400; 22; 1.84; 3; 20; 10; 1; 12; 200; 27; 25; 2 TABLET ORAL at 08:09

## 2022-02-14 RX ADMIN — DOCUSATE SODIUM 100 MG: 100 CAPSULE, LIQUID FILLED ORAL at 08:09

## 2022-02-14 RX ADMIN — IBUPROFEN 800 MG: 800 TABLET, FILM COATED ORAL at 08:09

## 2022-02-14 ASSESSMENT — PAIN DESCRIPTION - PAIN TYPE
TYPE: ACUTE PAIN

## 2022-02-14 NOTE — PROGRESS NOTES
POSTPARTUM    PROGRESS  NOTE;    PATIENT ID:  NAME:  Vanessa Chávez  MRN:               [id number]  YOB: 2000         21 y.o. female  at 40w4d PPD#1 s/p  late yesterday    Subjective: Doing well    Objective:    Vitals:    22 2043 226 22 0200   BP: 128/65 120/74 127/76 123/68   Pulse: 66 76 72 77   Resp:   18 18   Temp:   36.5 °C (97.7 °F) 36.5 °C (97.7 °F)   TempSrc:   Temporal Temporal   SpO2:   96% 96%   Weight:       Height:         General: No acute distress, resting comfortably in bed.  HEENT: normocephalic, nontraumatic, PERRLA, EOMI  Cardiovascular: Heart RRR with no murmurs, rubs or gallops. Distal Pulses 2+  Respiratory: symmetric chest expansion, lungs CTA bilaterally with no wheezes rales or rhonci  Abdomen: soft, mildly tender, fundus firm, +BS  Genitourinary: lochia light, denies excessive vaginal bleeding  Musculoskeletal: strength 5/5 in four extremities  Neuro: non focal with no numbness, tingling or changes in sensation    Recent Labs     22  1045 22  0340   WBC 10.0 12.6*   RBC 4.31 4.27   HEMOGLOBIN 11.3* 11.4*   HEMATOCRIT 34.7* 34.1*   MCV 80.5* 79.9*   MCH 26.2* 26.7*   RDW 39.4 39.0   PLATELETCT 181 174   MPV 11.8 11.2   NEUTSPOLYS 74.10*  --    LYMPHOCYTES 17.10*  --    MONOCYTES 7.10  --    EOSINOPHILS 0.60  --    BASOPHILS 0.30  --      No results for input(s): SODIUM, POTASSIUM, CHLORIDE, CO2, GLUCOSE, BUN, CPKTOTAL in the last 72 hours.    Current Meds:   Current Facility-Administered Medications   Medication Dose Frequency Provider Last Rate Last Admin   • oxytocin (PITOCIN) infusion (for post delivery)  125 mL/hr Continuous Logan Garcia M.D. 125 mL/hr at 22 125 mL/hr at 22   • lactated ringers infusion   PRN KORI Maxwell.       • docusate sodium (COLACE) capsule 100 mg  100 mg BID PRN KORI Maxwell.       • ibuprofen (MOTRIN) tablet 800 mg  800 mg Q8HRS PRN  OLGA Maxwell.P.R.N.       • acetaminophen (TYLENOL) tablet 1,000 mg  1,000 mg Q6HRS PRN BREONNA MaxwellP.R.N.   1,000 mg at 22   • PRN oxytocin (PITOCIN) (20 Units/1000 mL) PRN for excessive uterine bleeding - See Admin Instr  125-999 mL/hr Once PRN OLGA Maxwell.P.R.N.       • miSOPROStol (CYTOTEC) tablet 600 mcg  600 mcg Once PRN OLGA Maxwell.P.R.N.       • prenatal plus vitamin (STUARTNATAL 1+1) 27-1 MG tablet 1 Tablet  1 Tablet Daily-0800 BREONNA MaxwellP.R.NTy       Last reviewed on 2022  8:15 AM by Brady Hernandez Ass't       Assessment:  21 y.o. female  at 40w4d PPD#1 s/p -stable    Plan:   1. Routine care  2. Discharge tomorrow      Logan Garcia MD

## 2022-02-14 NOTE — ANESTHESIA PREPROCEDURE EVALUATION
Date: 22  Procedure: Labor Epidural        duran pregnancy at 40+4 weeks gestation. Anxiety. No other significant medical history or pregnancy complications.    Relevant Problems   No relevant active problems       Physical Exam    Airway   Mallampati: II  TM distance: >3 FB  Neck ROM: full       Cardiovascular - normal exam  Rhythm: regular  Rate: normal  (-) murmur     Dental - normal exam           Pulmonary - normal exam  Breath sounds clear to auscultation     Abdominal    Neurological - normal exam                 Anesthesia Plan    ASA 2       Plan - epidural   Neuraxial block will be labor analgesia                  Pertinent diagnostic labs and testing reviewed    Informed Consent:    Anesthetic plan and risks discussed with patient.

## 2022-02-14 NOTE — PROGRESS NOTES
PT arrived for IOL.    Admission questions discussed and SVE.    1030 RN at BS for IV, blood draw and covid swab. Pit started    1530 PT called out, RN at BS for SROM and SVE.    1625 RN and MD at BS for Epidural.

## 2022-02-14 NOTE — ANESTHESIA PROCEDURE NOTES
Epidural Block    Date/Time: 2/13/2022 4:26 PM  Performed by: Matias Keating M.D.  Authorized by: Matias Keating M.D.     Patient Location:  OB  Start Time:  2/13/2022 4:26 PM  End Time:  2/13/2022 4:33 PM  Reason for Block: labor analgesia    patient identified, IV checked, site marked, risks and benefits discussed, surgical consent, monitors and equipment checked, pre-op evaluation and timeout performed    Patient Position:  Sitting  Prep: ChloraPrep, patient draped and sterile technique    Monitoring:  Blood pressure, continuous pulse oximetry and heart rate  Approach:  Midline  Location:  L2-L3  Injection Technique:  RAUDEL saline  Skin infiltration:  Lidocaine  Strength:  1%  Dose:  3ml  Needle Type:  Tuohy  Needle Gauge:  17 G  Needle Length:  3.5 in  Loss of resistance::  5  Catheter Size:  19 G  Catheter at Skin Depth:  11  Test Dose Result:  Negative   Success on 2nd pass at same level  First pass aborted after patient reported right-sided paresthesia  Mild, transient right-sided paresthesia on entry to epidural space  No heme/CSF  Catheter threaded easily  Negative aspiration  No evidence of complications  Patient comfortable after loading dose

## 2022-02-14 NOTE — CARE PLAN
The patient is Stable - Low risk of patient condition declining or worsening         Progress made toward(s) clinical / shift goals:    Problem: Risk for Infection and Impaired Wound Healing  Goal: Patient will remain free from infection  Outcome: Progressing  Note: Pt afebrile, no s/s of infection     Problem: Pain  Goal: Patient's pain will be alleviated or reduced to the patient’s comfort goal  Outcome: Progressing  Note: Pain POC discussed. Pt has epidural, denies pain at this time       Patient is not progressing towards the following goals:

## 2022-02-14 NOTE — PROGRESS NOTES
185-Report received from MILLER Cabral. POC discussed  - of viable male infant, apgars [id number]-Pt up to restroom, voided, kyleigh care provided, gown changed  -Pt transferred to  via wheelchair. Report given to Daphne HBATT. POC discussed

## 2022-02-14 NOTE — PROGRESS NOTES
Received patient from labor and delivery via wheelchair with Radha BHATT.  Assessment done. Fundus firm. Lochia light rubra. Instructed patient to increase fluid intake and to void as needed and to watch for increased bleeding. Patient states having a headache and requesting Tylenol; will release orders and give Tylenol. Call light within reach. Will continue to monitor VS.

## 2022-02-14 NOTE — CARE PLAN
The patient is Stable - Low risk of patient condition declining or worsening    Shift Goals  Clinical Goals: maintain tolerable pain level; breastfeed    Progress made toward(s) clinical / shift goals:  reviewed 0-10 pain scale and available pain medication. Pt has been breastfeeding baby and latching him independently.     Patient is not progressing towards the following goals:

## 2022-02-14 NOTE — L&D DELIVERY NOTE
Delivery Note    PATIENT ID:  NAME:  Vanessa Chávez  MRN:               [id number]  YOB: 2000    Labor Course  Pt was admitted for elective IOL.  Pt progressed with pitocin and AROM.     On 2022  at 17:20, this 21 y.o.,  40w4d now   , GBS negative female delivered via  under epidural anesthesia a viable male infant weight pending with APGAR scores of 8 and 9 at one and five minutes.   Baby to maternal abdomen.  Spontaneous cry.  Mouth and nares bulb suctioned by RN. Delayed cord clamping occurred with cord doubly clamped by myself and cut by FOB after pulsations had stopped.  Pitocin infusing in IVF.  Spontaneous delivery of placenta grossly intact @ 17:24.  CVx3. FF and bleeding small.  Upon vaginal exam, there was first degree perineal laceration which was repaired using 3.0 chromic in the usual sterile fashion. Bilateral labial abrasions were hemostatic and no repaired. Pt and infant are stable and bonding.  Lap count: correct.  Estimated blood loss: 150.      LAURA Gaytan Dr., attending physician

## 2022-02-14 NOTE — LACTATION NOTE
This note was copied from a baby's chart.  Mother latching baby and reports that feeding at breast is going well. Latch improved after her nurse assisted her this morning. Parent education pamphlet on breast feeding and safe sleep reviewed with them.

## 2022-02-14 NOTE — PROGRESS NOTES
0700: Bedside report completed with DWAYNE Serna RN and assumed care of pt. Bed locked and in lowest position with personal belongings and call light within reach. Pt reports all needs met at this time.     0730: 12 hour chart check completed. Orders/MAR reviewed.     0900: On entering room, patient observed co-sleeping in bed with . Pt gently woken and safe sleep education given while  returned to crib. Patient assessment completed. Discussed pain management plan and patient requests ibuprofen be offered as available. Patient denies dizziness and headaches; states she is voiding w/o difficulty. Reviewed plan of care, all questions answered, and rounding in place.

## 2022-02-14 NOTE — DISCHARGE PLANNING
Discharge Planning Assessment Post Partum    Reason for Referral:  Consult-history of depression, anxiety, and panic attacks  Address: [address] [place] [address] [place] [address]  Phone: [phone]  Type of Living Situation: living with FOB and son  Mom Diagnosis: Pregnancy  Baby Diagnosis: [place]-40.4 weeks  Primary Language: English    Name of Baby: Ryder Ohara (: 22)  Father of the Baby: Jones Ohara  Involved in baby’s care? Yes  Contact Information: [phone]    Prenatal Care: Yes-[facility]  Mom's PCP: MILO Zuniga  PCP for new baby: Dr. Colletti    Support System: FOB  Coping/Bonding between mother & baby: Yes  Source of Feeding: breast feeding  Supplies for Infant: prepared for infant; denies any needs    Mom's Insurance: [employer]  Baby Covered on Insurance:Yes  Mother Employed/School: [employer]  Other children in the home/names & ages: son-age 3 years old    Financial Hardship/Income: No   Mom's Mental status: alert and oriented  Services used prior to admit: Medicaid    CPS History: No  Psychiatric History: history of depression, anxiety, and panic attacks.  Offered counseling and support group resources and MOB declined needing them  Domestic Violence History: No  Drug/ETOH History: No    Resources Provided: Offered, but MOB declined needing anything and stated they are well-prepared for infant   Referrals Made: None     Clearance for Discharge: Infant is cleared to discharge home with parents

## 2022-02-14 NOTE — ANESTHESIA POSTPROCEDURE EVALUATION
Patient: Vanessa Chávez    Procedure Summary     Date: 02/13/22 Room / Location:     Anesthesia Start: 1626 Anesthesia Stop: 1920    Procedure: Labor Epidural Diagnosis:     Scheduled Providers:  Responsible Provider: Matias Keating M.D.    Anesthesia Type: epidural ASA Status: 2          Final Anesthesia Type: epidural  Last vitals  BP   Blood Pressure: 130/65    Temp   36.7 °C (98 °F)    Pulse   69   Resp   18    SpO2   98 %      Anesthesia Post Evaluation    Patient location during evaluation: floor  Patient participation: complete - patient participated  Level of consciousness: awake and alert  Pain score: 2    Airway patency: patent  Anesthetic complications: no  Cardiovascular status: hemodynamically stable  Respiratory status: acceptable  Hydration status: euvolemic    PONV: none          No complications documented.

## 2022-02-14 NOTE — ANESTHESIA TIME REPORT
Anesthesia Start and Stop Event Times     Date Time Event    2/13/2022 1622 Ready for Procedure     1626 Anesthesia Start     1920 Anesthesia Stop        Responsible Staff  02/13/22    Name Role Begin End    Matias Keating M.D. Anesth 1626 1920        Preop Diagnosis (Free Text):  Pre-op Diagnosis     [place] pregnancy at 40 weeks gestation, labor pain        Preop Diagnosis (Codes):    Premium Reason  E. Weekend    Comments:                                                                       
Yes

## 2022-02-15 VITALS
RESPIRATION RATE: 16 BRPM | SYSTOLIC BLOOD PRESSURE: 115 MMHG | WEIGHT: 198.41 LBS | HEIGHT: 71 IN | DIASTOLIC BLOOD PRESSURE: 76 MMHG | BODY MASS INDEX: 27.78 KG/M2 | HEART RATE: 75 BPM | OXYGEN SATURATION: 96 % | TEMPERATURE: 97.6 F

## 2022-02-15 PROCEDURE — A9270 NON-COVERED ITEM OR SERVICE: HCPCS | Performed by: NURSE PRACTITIONER

## 2022-02-15 PROCEDURE — 700102 HCHG RX REV CODE 250 W/ 637 OVERRIDE(OP): Performed by: NURSE PRACTITIONER

## 2022-02-15 RX ORDER — ACETAMINOPHEN 500 MG
1000 TABLET ORAL EVERY 6 HOURS PRN
Qty: 30 TABLET | Refills: 0 | Status: SHIPPED | OUTPATIENT
Start: 2022-02-15

## 2022-02-15 RX ORDER — IBUPROFEN 800 MG/1
800 TABLET ORAL EVERY 8 HOURS PRN
Qty: 30 TABLET | Refills: 0 | Status: SHIPPED | OUTPATIENT
Start: 2022-02-15

## 2022-02-15 RX ADMIN — DOCUSATE SODIUM 100 MG: 100 CAPSULE, LIQUID FILLED ORAL at 08:00

## 2022-02-15 RX ADMIN — PRENATAL WITH FERROUS FUM AND FOLIC ACID 1 TABLET: 3080; 920; 120; 400; 22; 1.84; 3; 20; 10; 1; 12; 200; 27; 25; 2 TABLET ORAL at 08:00

## 2022-02-15 ASSESSMENT — EDINBURGH POSTNATAL DEPRESSION SCALE (EPDS)
I HAVE BEEN SO UNHAPPY THAT I HAVE HAD DIFFICULTY SLEEPING: NOT AT ALL
I HAVE BEEN SO UNHAPPY THAT I HAVE BEEN CRYING: ONLY OCCASIONALLY
I HAVE BEEN ANXIOUS OR WORRIED FOR NO GOOD REASON: YES, VERY OFTEN
I HAVE FELT SCARED OR PANICKY FOR NO GOOD REASON: YES, QUITE A LOT
THINGS HAVE BEEN GETTING ON TOP OF ME: NO, MOST OF THE TIME I HAVE COPED QUITE WELL
I HAVE BLAMED MYSELF UNNECESSARILY WHEN THINGS WENT WRONG: YES, SOME OF THE TIME
I HAVE FELT SAD OR MISERABLE: NOT VERY OFTEN
THE THOUGHT OF HARMING MYSELF HAS OCCURRED TO ME: NEVER
I HAVE LOOKED FORWARD WITH ENJOYMENT TO THINGS: AS MUCH AS I EVER DID
I HAVE BEEN ABLE TO LAUGH AND SEE THE FUNNY SIDE OF THINGS: NOT QUITE SO MUCH NOW

## 2022-02-15 ASSESSMENT — PAIN DESCRIPTION - PAIN TYPE
TYPE: ACUTE PAIN

## 2022-02-15 NOTE — DISCHARGE INSTRUCTIONS
PATIENT DISCHARGE EDUCATION INSTRUCTION SHEET  REASONS TO CALL YOUR OBSTETRICIAN  · Persistent fever, shaking, chills (Temperature higher than 100.4) may indicate you have an infection  · Heavy bleeding: soaking more than 1 pad per hour; Passing clots an egg-sized clot or bigger may mean you have an postpartum hemorrhage  · Foul odor from vagina or bad smelling or discolored discharge or blood  · Breast infection (Mastitis symptoms); breast pain, chills, fever, redness or red streaks, may feel flu like symptoms  · Urinary pain, burning or frequency  · Incision that is not healing, increased redness, swelling, tenderness or pain, or any pus from episiotomy or  site may mean you have an infection  · Redness, swelling, warmth, or painful to touch in the calf area of your leg may mean you have a blood clot  · Severe or intensified depression, thoughts or feelings of wanting to hurt yourself or someone else   · Pain in chest, obstructed breathing or shortness of breath (trouble catching your breath) may mean you are having a postpartum complication. Call your provider immediately   · Headache that does not get better, even after taking medicine, a bad headache with vision changes or pain in the upper right area of your belly may mean you have high blood pressure or post birth preeclampsia. Call your provider immediately    HAND WASHING  All family and friends should wash their hands:  · Before and after holding the baby  · Before feeding the baby  · After using the restroom or changing the baby's diaper    WOUND CARE  Ask your physician for additional care instructions. In general:  · Episiotomy/Laceration  · May use kyleigh-spray bottle, witch hazel pads and dermaplast spray for comfort  · Use kyleigh-spray bottle after urinating to cleanse perineal area  · To prevent burning during urination spray kyleigh-water bottle on labial area   · Pat perineal area dry until episiotomy/laceration is healed  · Continue to use  kyleigh-bottle until bleeding stops as needed  · If have a 2nd degree laceration or greater, a Sitz bath can offer relief from soreness, burning, and inflammation   · Sitz Bath   · Sit in 6 inches of warm water and soak laceration as needed until the laceration heals    VAGINAL CARE AND BLEEDING  · Nothing inside vagina for 6 weeks:   · No sexual intercourse, tampons or douching  · Bleeding may continue for 2-4 weeks. Amount and color may vary  · Soaking 1 pad or more in an hour for several hours is considered heavy bleeding  · Passing large egg sized blood clots can be concerning  · If you feel like you have heavy bleeding or are having increasing amount of blood clots call your Obstetrician immediately  · If you begin feeling faint upon standing, feeling sick to your stomach, have clammy skin, a really fast heartbeat, have chills, start feeling confused, dizzy, sleepy or weak, or feeling like you're going to faint call your Obstetrician immediately    HYPERTENSION   Preeclampsia or gestational hypertension are types of high blood pressure that only pregnant women can get. It is important for you to be aware of symptoms to seek early intervention and treatment. If you have any of these symptoms immediately call your Obstetrician    · Vision changes or blurred vision   · Severe headache or pain that is unrelieved with medication and will not go away  · Persistent pain in upper abdomen or shoulder   · Increased swelling of face, feet, or hands  · Difficulty breathing or shortness of breath at rest  · Urinating less than usual    URINATION AND BOWEL MOVEMENTS  · Eating more fiber (bran cereal, fruits, and vegetables) and drinking plenty of fluids will help to avoid constipation  · Urinary frequency and urgency after childbirth is normal  · If you experience any urinary pain, burning or frequency call your provider    BIRTH CONTROL  · It is possible to become pregnant at any time after delivery and while  "breastfeeding  · Plan to discuss a method of birth control with your physician at your post delivery follow up visit    POSTPARTUM BLUES  During the first few days after birth, you may experience a sense of the \"blues\" which may include impatience, irritability or even crying. These feelings come and go quickly. However, as many as 1 in 10 women experience emotional symptoms known as postpartum depression.     POSTPARTUM DEPRESSION  May start as early as the second or third day after delivery or take several weeks or months to develop. Symptoms of \"blues\" are present, but are more intense: Crying spells; loss of appetite; feelings of hopelessness or loss of control; fear of touching the baby; over concern or no concern at all about the baby; little or no concern about your own appearance/caring for yourself; and/or inability to sleep or excessive sleeping. Contact your Obstetrician if you are experiencing any of these symptoms     PREVENTING SHAKEN BABY  If you are angry or stressed, PUT THE BABY IN THE CRIB, step away, take some deep breaths, and wait until you are calm to care for the baby. DO NOT SHAKE THE BABY. You are not alone, call a supporter for help.  · [facility] Center 24/7 crisis call line (344-063-8032) or (1-728.844.3713)  · You can also text them, text \"ANSWER\" (553996)      "

## 2022-02-15 NOTE — DISCHARGE SUMMARY
Discharge Summary:     Date of Admission: 2022  Date of Discharge: 02/15/22      Admitting diagnosis:    1. Pregnancy @ 40w4d        Discharge Diagnosis:   1. Status post vaginal, spontaneous.    Past Medical History:   Diagnosis Date   • Tourette's disorder 2012     OB History    Para Term  AB Living   3 2 2   1 2   SAB IAB Ectopic Molar Multiple Live Births   1       0 2      # Outcome Date GA Lbr Nakul/2nd Weight Sex Delivery Anes PTL Lv   3 Term 20 40w4d  3.82 kg (8 lb 6.8 oz) M Vag-Spont EPI N SHERIDAN   2 Term 19 41w0d / 02:19 3.95 kg (8 lb 11.3 oz) M Vag-Spont EPI N SHERIDAN   1 SAB 10/01/17     SAB        History reviewed. No pertinent surgical history.  Patient has no known allergies.    Patient Active Problem List   Diagnosis   • Motor tic disorder   • JAMES (generalized anxiety disorder)   • Panic attacks   • Severe episode of recurrent major depressive disorder, without psychotic features (HCC)   • Supervision of other normal pregnancy, antepartum   • Labor and delivery, indication for care       Hospital Course:   Pt is a 21 y.o. now  who presented for elective IOL. Her labor course was unremarkable, using pit and AROM. Progressed normally. Delivered on . Post-partum course unremarkable. Pt held yesterday  baby but is ready for discharge.    Pain well controlled with tylenol and ibuprofen.    Pt desires BC, just not depo in hospital. Interested in learning more about options at 5 week follow up.      Physical Exam:  Temp:  [36.4 °C (97.6 °F)-36.5 °C (97.7 °F)] 36.4 °C (97.6 °F)  Pulse:  [75-84] 75  Resp:  [16] 16  BP: (115)/(75-76) 115/76  SpO2:  [96 %-97 %] 96 %  Physical Exam  General: well and resting  Chest/Breasts: nipples intact and breasts soft   Abdomen: nontender, soft, non-distended  Fundus: firm, below umbilicus and nontender  Incision: not applicable, (vaginal delivery)  Perineum: deferred  Extremities: symmetric and no edema, calves nontender    Current  Facility-Administered Medications   Medication Dose   • oxytocin (PITOCIN) infusion (for post delivery)  125 mL/hr   • lactated ringers infusion     • docusate sodium (COLACE) capsule 100 mg  100 mg   • ibuprofen (MOTRIN) tablet 800 mg  800 mg   • acetaminophen (TYLENOL) tablet 1,000 mg  1,000 mg   • PRN oxytocin (PITOCIN) (20 Units/1000 mL) PRN for excessive uterine bleeding - See Admin Instr  125-999 mL/hr   • miSOPROStol (CYTOTEC) tablet 600 mcg  600 mcg   • prenatal plus vitamin (STUARTNATAL 1+1) 27-1 MG tablet 1 Tablet  1 Tablet       Recent Labs     02/13/22  1045 02/14/22  0340   WBC 10.0 12.6*   RBC 4.31 4.27   HEMOGLOBIN 11.3* 11.4*   HEMATOCRIT 34.7* 34.1*   MCV 80.5* 79.9*   MCH 26.2* 26.7*   MCHC 32.6* 33.4*   RDW 39.4 39.0   PLATELETCT 181 174   MPV 11.8 11.2         Activity/ Discharge Instructions::   Discharge to home  Pelvic Rest x 6 weeks  No heavy lifting x4 weeks  Call or come to ED for: heavy vaginal bleeding, fever >100.4, severe abdominal pain, severe headache, chest pain, shortness of breath,  N/V, incisional drainage, or other concerns.    Follow up with [facility] for BC options. Pt did not want depo here today.       Follow up:  [facility] in 5 weeks for vaginal deliver      Discharge Meds:   No current outpatient medications on file.       Rubén Yeh M.D.

## 2022-02-15 NOTE — PROGRESS NOTES
0700: Bedside report completed with DWAYNE Serna RN and assumed care of pt. Pt discussing POC with physician - will return later.     0730: 12 hour chart check completed. Orders/MAR reviewed.     0800: Patient assessment completed. Discussed pain management plan and patient to request medication as needed. Patient denies dizziness and headaches; states she is voiding w/o difficulty. Reviewed plan of care, all questions answered, and rounding in place.

## 2022-02-15 NOTE — CARE PLAN
The patient is Stable - Low risk of patient condition declining or worsening    Shift Goals  Clinical Goals: maintain tolerable pain level    Progress made toward(s) clinical / shift goals:  pt has had no c/o pain. Pt aware of pain medication available. Pt encouraged to ambulate in the hallways.     Patient is not progressing towards the following goals:

## 2022-02-15 NOTE — CARE PLAN
The patient is Stable - Low risk of patient condition declining or worsening    Shift Goals  Clinical Goals: VSS; patient will meet all clinical goals for discharge     Progress made toward(s) clinical / shift goals:  VSS; all clinical goals met for discharge.     Problem: Knowledge Deficit - L&D  Goal: Patient and family/caregivers will demonstrate understanding of plan of care, disease process/condition, diagnostic tests and medications  Outcome: Progressing     Problem: Risk for Excess Fluid Volume  Goal: Patient will demonstrate pulse, blood pressure and neurologic signs within expected ranges and without any respiratory complications  Outcome: Progressing     Problem: Psychosocial - L&D  Goal: Patient's level of anxiety will decrease  Outcome: Progressing  Goal: Patient will be able to discuss coping skills during hospitalization  Outcome: Progressing  Goal: Patient's ability to re-evaluate and adapt role responsibilities will improve  Outcome: Progressing  Goal: Spiritual and cultural needs incorporated into hospitalization  Outcome: Progressing     Problem: Risk for Venous Thromboembolism (VTE)  Goal: VTE prevention measures will be implemented and patient will remain free from VTE  Outcome: Progressing     Problem: Risk for Infection and Impaired Wound Healing  Goal: Patient will remain free from infection  Outcome: Progressing  Goal: Patient's wound/surgical incision will decrease in size and heals properly  Outcome: Progressing     Problem: Risk for Fluid Imbalance  Goal: Patient's fluid volume balance will be maintained or improve  Outcome: Progressing     Problem: Pain  Goal: Patient's pain will be alleviated or reduced to the patient’s comfort goal  Outcome: Progressing     Problem: Risk for Injury  Goal: Patient and fetus will be free of preventable injury/complications  Outcome: Progressing     Problem: Discharge Barriers/Planning  Goal: Patient's continuum of care needs are met  Outcome:  Progressing     Problem: Pain - Standard  Goal: Alleviation of pain or a reduction in pain to the patient’s comfort goal  Outcome: Progressing     Problem: Knowledge Deficit - Standard  Goal: Patient and family/care givers will demonstrate understanding of plan of care, disease process/condition, diagnostic tests and medications  Outcome: Progressing     Problem: Knowledge Deficit - Postpartum  Goal: Patient will verbalize and demonstrate understanding of self and infant care  Outcome: Progressing     Problem: Psychosocial - Postpartum  Goal: Patient will verbalize and demonstrate effective bonding and parenting behavior  Outcome: Progressing     Problem: Altered Physiologic Condition  Goal: Patient physiologically stable as evidenced by normal lochia, palpable uterine involution and vitals within normal limits  Outcome: Progressing     Problem: Infection - Postpartum  Goal: Postpartum patient will be free of signs and symptoms of infection  Outcome: Progressing       Patient is not progressing towards the following goals: NA

## 2022-02-15 NOTE — PROGRESS NOTES
Pt scored a 12 on the EPDS. Pt was seen yesterday by social work and declined resources. Discussed score with patient and pt offered to have OB contacted for medications or resources. Pt declined OB evaluation and states she is aware of increased risk for PP depression. Pt reports she made an appointment with a new PCP prior to admission to establish care and resume medications/therapy. Pt states she would prefer to utilize new PCP for mental health care. Dr. Yeh updated on score and pt plan.

## 2022-02-15 NOTE — CARE PLAN
The patient is Stable - Low risk of patient condition declining or worsening    Shift Goals  Clinical Goals: VSS    Progress made toward(s) clinical / shift goals:  VSS     Problem: Altered Physiologic Condition  Goal: Patient physiologically stable as evidenced by normal lochia, palpable uterine involution and vitals within normal limits  Outcome: Progressing  NOTE: Patient is physiologically stable as evidenced by light lochia rubra, firm fundus one fingerbreadth below the umbilicus, and vital signs WDL. Will continue to monitor patient condition.       Problem: Infection - Postpartum  Goal: Postpartum patient will be free of signs and symptoms of infection  Outcome: Progressing   NOTE: Patient is afebrile and absent for other signs/symptoms of infection. Vital signs WDL.  Will continue to monitor patient condition.         Patient is not progressing towards the following goals: NA

## 2022-02-15 NOTE — PROGRESS NOTES
Discharge education reviewed with patient and partner. Pharmacy and prescription information discussed and medication list reviewed. Pt verbalized understanding of discharge instructions including follow-up appointments. Patient given copies of discharge education and discharge summary handouts. Pt verbalizes understanding and all questions answered.    Gen: patient is well appearing, asleep, no acute distress  HEENT: NC/AT, pupils equal, responsive, reactive to light and accomodation, no conjunctivitis or scleral icterus; no nasal discharge or congestion. OP without exudates/erythema.   Neck: FROM, supple, no cervical LAD  Chest: CTA b/l, no crackles/wheezes, good air entry, no tachypnea or retractions  CV: regular rate and rhythm, no murmurs   Abd: soft, nontender, nondistended, no HSM appreciated, +BS  Extrem: No joint effusion or tenderness; FROM of all joints; no deformities or erythema noted. 2+ peripheral pulses, WWP.   Skin: normal  Neuro: grossly intact

## 2022-02-15 NOTE — LACTATION NOTE
This note was copied from a baby's chart.  Follow up:     MOB states latch improved overnight once able to correct baby's lower lip.  States she has some nipple tenderness, but has not worsened.  Using colostrom and lanolin prn. Can feel breasts feeling heavier and filling.  MOB supplemented with 10ml Enfamil overnight due to infant fussy and cluster feeding.     Discussed normal feeding patterns including cluster feeding tendencies.  MOB aware of frequency of feedings to be at least 8x in 24hrs but to not wait longer than 4hrs. MOB states she does not plan on supplementing, but needed sleep overnight and decided to give one bottle.   MOB has supplementation guidelines and was taught paced bottle feeding by noc RN.     Reviewed increase in diaper output as milk increases in volume, remedies for engorgement.      MOB denies any further lactation needs or questions at this time.  Reminded Mob we are here throughout the day if needed.     Dyad plans to be d/c home this morning.     MOB established with [facility] and aware of their lactation services if needed after d/c.

## 2022-02-15 NOTE — PROGRESS NOTES
Report received from Lisette BHATT. Pt assessment complete. Pt states no pain at this time. Pt encouraged to call with needs. Call light is within reach.

## 2022-04-26 ENCOUNTER — APPOINTMENT (OUTPATIENT)
Dept: RADIOLOGY | Facility: IMAGING CENTER | Age: 22
End: 2022-04-26
Attending: NURSE PRACTITIONER
Payer: MEDICAID

## 2022-04-26 ENCOUNTER — OFFICE VISIT (OUTPATIENT)
Dept: URGENT CARE | Facility: CLINIC | Age: 22
End: 2022-04-26
Payer: MEDICAID

## 2022-04-26 VITALS
TEMPERATURE: 97.2 F | BODY MASS INDEX: 23.6 KG/M2 | RESPIRATION RATE: 16 BRPM | HEIGHT: 71 IN | DIASTOLIC BLOOD PRESSURE: 74 MMHG | WEIGHT: 168.6 LBS | OXYGEN SATURATION: 96 % | SYSTOLIC BLOOD PRESSURE: 108 MMHG | HEART RATE: 71 BPM

## 2022-04-26 DIAGNOSIS — M54.50 ACUTE MIDLINE LOW BACK PAIN WITHOUT SCIATICA: ICD-10-CM

## 2022-04-26 PROCEDURE — 72100 X-RAY EXAM L-S SPINE 2/3 VWS: CPT | Mod: TC | Performed by: NURSE PRACTITIONER

## 2022-04-26 PROCEDURE — 99214 OFFICE O/P EST MOD 30 MIN: CPT | Performed by: NURSE PRACTITIONER

## 2022-04-26 NOTE — PROGRESS NOTES
Chief Complaint   Patient presents with   • Back Injury     Lower spine pain, painful walking, standing up, laying down x 2 weeks       HISTORY OF PRESENT ILLNESS: Patient is a pleasant 21 y.o. female who presents today with complaints of low back pain.  Patient notes that 2 weeks ago she was horse playing around with her boyfriend, lifted him up and immediately heard a crack and felt pain to her low back.  She has had pain to the area since.  Pain is nonradiating.  Denies fever, chills, malaise, hematuria, saddle anesthesia, numbness or tingling, unilateral weakness, or loss of bowel or bladder.  Denies previous back injury.  She has tried Tylenol and ibuprofen for pain relief.      Patient Active Problem List    Diagnosis Date Noted   • Labor and delivery, indication for care 02/13/2022   • Supervision of other normal pregnancy, antepartum 10/25/2021   • JAMES (generalized anxiety disorder) 12/20/2019   • Panic attacks 12/20/2019   • Severe episode of recurrent major depressive disorder, without psychotic features (HCC) 12/20/2019   • Motor tic disorder 08/24/2012       Allergies:Patient has no known allergies.    Current Outpatient Medications Ordered in Epic   Medication Sig Dispense Refill   • acetaminophen (TYLENOL) 500 MG Tab Take 2 Tablets by mouth every 6 hours as needed. 30 Tablet 0   • ibuprofen (MOTRIN) 800 MG Tab Take 1 Tablet by mouth every 8 hours as needed. 30 Tablet 0   • Prenatal MV-Min-Fe Fum-FA-DHA (PRENATAL 1 PO) Take  by mouth.       No current Epic-ordered facility-administered medications on file.       Past Medical History:   Diagnosis Date   • Tourette's disorder 8/24/2012       Social History     Tobacco Use   • Smoking status: Never Smoker   • Smokeless tobacco: Never Used   Vaping Use   • Vaping Use: Never used   Substance Use Topics   • Alcohol use: No   • Drug use: No       Family Status   Relation Name Status   • Mo  Alive   • Bro  Alive   • MGMo  Alive   • Fa  Alive   • MUnc  (Not  "Specified)   • Son  Alive     Family History   Problem Relation Age of Onset   • Other Mother         IPT   • Anemia Mother    • Psychiatric Illness Brother         tourette movement d/o - resolved   • Arthritis Maternal Grandmother    • Diabetes Maternal Grandmother    • Diabetes Maternal Uncle         T2DM   • No Known Problems Son        ROS:  Review of Systems   Constitutional: Negative for fever, chills, weight loss, malaise, and fatigue.   HENT: Negative for ear pain, nosebleeds, congestion, sore throat and neck pain.    Eyes: Negative for vision changes.   Neuro: Negative for headache, sensory changes, weakness, seizure, LOC.   Cardiovascular: Negative for chest pain, palpitations, orthopnea and leg swelling.   Respiratory: Negative for cough, sputum production, shortness of breath and wheezing.   Gastrointestinal: Negative for abdominal pain, nausea, vomiting or diarrhea.   Genitourinary: Negative for dysuria, urgency and frequency.  Musculoskeletal: Positive for low back pain. Negative for falls, neck pain, joint pain, myalgias.   Skin: Negative for rash, diaphoresis.     Exam:  /74 (BP Location: Right arm, Patient Position: Sitting, BP Cuff Size: Adult)   Pulse 71   Temp 36.2 °C (97.2 °F) (Temporal)   Resp 16   Ht 1.803 m (5' 11\")   Wt 76.5 kg (168 lb 9.6 oz)   SpO2 96%   General: well-nourished, well-developed female in NAD  Head: normocephalic, atraumatic  Eyes: PERRLA, no conjunctival injection, acuity grossly intact, lids normal.  Ears: normal shape and symmetry, no tenderness, no discharge. External canals are without any significant edema or erythema. Tympanic membranes are without any inflammation, no effusion. Gross auditory acuity is intact.  Nose: symmetrical without tenderness, no discharge.  Mouth/Throat: reasonable hygiene, no erythema, exudates or tonsillar enlargement.  Neck: no masses, range of motion within normal limits, no tracheal deviation. No obvious thyroid enlargement. " "  Lymph: no cervical adenopathy. No supraclavicular adenopathy.   Neuro: alert and oriented. Cranial nerves 1-12 grossly intact. No sensory deficit.   Cardiovascular: regular rate and rhythm. No edema.  Pulmonary: no distress. Chest is symmetrical with respiration, no wheezes, crackles, or rhonchi.   Musculoskeletal: Lumbar spine: pt exhibits decreased range of motion with forward flexion and tenderness to the lumbar spine and left para lumbar region. Pt exhibits no swelling, spasm, edema or deformity. Patient has normal reflexes, patellar reflexes are 2+ on the right side and 2+ on the left side. Patient exhibits normal muscle tone.  Negative straight leg raise. Gait even and steady. No clubbing.   Skin: warm, dry, intact, no clubbing, no cyanosis, no rashes.   Psych: appropriate mood, affect, judgement.       Dx lumbar spine radiology reading \"Normal complete lumbar spine series.\"      Assessment/Plan:  1. Acute midline low back pain without sciatica  DX-LUMBAR SPINE-2 OR 3 VIEWS       Patient is a pleasant 21-year-old female who presents with low back pain after lifting her boyfriend.  The patient is requesting an x-ray today, x-ray is negative for any acute process.  She is instructed to take OTC ibuprofen, perform gentle stretching's, ice and heat therapy.  Referral to spine is placed for follow-up.  Supportive care, differential diagnoses, and indications for immediate follow-up discussed with patient.   Pathogenesis of diagnosis discussed including typical length and natural progression.   Instructed to return to clinic or nearest emergency department for any change in condition, further concerns, or worsening of symptoms.  Patient states understanding of the plan of care and discharge instructions.  Instructed to make an appointment, for follow up, with her primary care provider.        Please note that this dictation was created using voice recognition software. I have made every reasonable attempt to " correct obvious errors, but I expect that there are errors of grammar and possibly content that I did not discover before finalizing the note. I spent a total of 30 minutes with record review, exam, communication with the patient, and documentation of this encounter.        KORI Damon.

## 2023-01-26 NOTE — NON-PROVIDER
Vanessa Chávez is a 20 y.o. female here for a non-provider visit for PPD reading -- Step 2 of 2.      1.  Resulted in Epic under enter/edit results? Yes   2.  TB evaluation questionnaire scanned into chart and original given to patient?Yes      3. Was induration greater than 0 mm? No.           good balance

## 2023-07-11 ENCOUNTER — OFFICE VISIT (OUTPATIENT)
Dept: URGENT CARE | Facility: CLINIC | Age: 23
End: 2023-07-11
Payer: MEDICAID

## 2023-07-11 VITALS
BODY MASS INDEX: 21.7 KG/M2 | WEIGHT: 155 LBS | HEIGHT: 71 IN | RESPIRATION RATE: 14 BRPM | SYSTOLIC BLOOD PRESSURE: 118 MMHG | OXYGEN SATURATION: 98 % | TEMPERATURE: 97.4 F | HEART RATE: 85 BPM | DIASTOLIC BLOOD PRESSURE: 64 MMHG

## 2023-07-11 DIAGNOSIS — L20.9 ATOPIC DERMATITIS OF SCALP: ICD-10-CM

## 2023-07-11 PROCEDURE — 3074F SYST BP LT 130 MM HG: CPT | Performed by: PHYSICIAN ASSISTANT

## 2023-07-11 PROCEDURE — 99214 OFFICE O/P EST MOD 30 MIN: CPT | Performed by: PHYSICIAN ASSISTANT

## 2023-07-11 PROCEDURE — 3078F DIAST BP <80 MM HG: CPT | Performed by: PHYSICIAN ASSISTANT

## 2023-07-11 RX ORDER — METHYLPREDNISOLONE 4 MG/1
TABLET ORAL
Qty: 21 TABLET | Refills: 0 | Status: SHIPPED | OUTPATIENT
Start: 2023-07-11

## 2023-07-11 RX ORDER — HYDROXYZINE HYDROCHLORIDE 25 MG/1
25 TABLET, FILM COATED ORAL 3 TIMES DAILY PRN
Qty: 30 TABLET | Refills: 0 | Status: SHIPPED | OUTPATIENT
Start: 2023-07-11

## 2023-07-11 RX ORDER — MOMETASONE FUROATE 1 MG/ML
1 SOLUTION TOPICAL DAILY
Qty: 60 ML | Refills: 0 | Status: SHIPPED | OUTPATIENT
Start: 2023-07-11 | End: 2023-07-18

## 2023-07-11 ASSESSMENT — ENCOUNTER SYMPTOMS
NAUSEA: 0
HEADACHES: 0
CHILLS: 0
DIZZINESS: 0
VOMITING: 0
FEVER: 0
NECK PAIN: 0

## 2023-07-11 NOTE — PROGRESS NOTES
Subjective:     CHIEF COMPLAINT  Scalp itching x3 weeks    HPI  Vanessa Chávez is a very pleasant 22 y.o. female who presents to the clinic with an itchy scalp x3 weeks.  Patient states she noted some mild redness on her scalp and over the last few days feels like her posterior cervical lymph nodes are slightly swollen.  States her Scalp has been itchy persistently.  She denies any dandruff or flaking skin.  Denies any new shampoos or conditioners.  No new soaps or detergents.  No daily medications.  No history of psoriasis or eczema.  She has tried anti-itch shampoo without relief.  No other members of the household are experiencing similar symptoms.    REVIEW OF SYSTEMS  Review of Systems   Constitutional:  Negative for chills, fever and malaise/fatigue.   Gastrointestinal:  Negative for nausea and vomiting.   Musculoskeletal:  Negative for neck pain.   Skin:  Positive for itching.        Pruritic scalp   Neurological:  Negative for dizziness and headaches.       PAST MEDICAL HISTORY  Patient Active Problem List    Diagnosis Date Noted    Labor and delivery, indication for care 02/13/2022    Supervision of other normal pregnancy, antepartum 10/25/2021    JAMES (generalized anxiety disorder) 12/20/2019    Panic attacks 12/20/2019    Severe episode of recurrent major depressive disorder, without psychotic features (HCC) 12/20/2019    Motor tic disorder 08/24/2012       SURGICAL HISTORY  patient denies any surgical history    ALLERGIES  No Known Allergies    CURRENT MEDICATIONS  Home Medications       Reviewed by Mckinley Bobo P.A.-C. (Physician Assistant) on 07/11/23 at 1523  Med List Status: <None>     Medication Last Dose Status   acetaminophen (TYLENOL) 500 MG Tab Not Taking Active   ibuprofen (MOTRIN) 800 MG Tab Not Taking Active   Prenatal MV-Min-Fe Fum-FA-DHA (PRENATAL 1 PO) Not Taking Active                    SOCIAL HISTORY  Social History     Tobacco Use    Smoking status: Never    Smokeless tobacco:  "Never   Vaping Use    Vaping Use: Never used   Substance and Sexual Activity    Alcohol use: No    Drug use: No    Sexual activity: Yes     Partners: Male     Comment: Committed relationship.       FAMILY HISTORY  Family History   Problem Relation Age of Onset    Other Mother         IPT    Anemia Mother     Psychiatric Illness Brother         tourette movement d/o - resolved    Arthritis Maternal Grandmother     Diabetes Maternal Grandmother     Diabetes Maternal Uncle         T2DM    No Known Problems Son           Objective:     VITAL SIGNS: /64   Pulse 85   Temp 36.3 °C (97.4 °F) (Temporal)   Resp 14   Ht 1.803 m (5' 11\")   Wt 70.3 kg (155 lb)   SpO2 98%   BMI 21.62 kg/m²     PHYSICAL EXAM  Physical Exam  Constitutional:       General: She is not in acute distress.     Appearance: Normal appearance. She is not ill-appearing, toxic-appearing or diaphoretic.   HENT:      Head: Normocephalic and atraumatic.      Comments: Scalp is slightly erythematous without any flaking, pustules or papules.  No scaling or patches present.  No dandruff, mites or nits.  Eyes:      Conjunctiva/sclera: Conjunctivae normal.   Cardiovascular:      Rate and Rhythm: Normal rate and regular rhythm.      Pulses: Normal pulses.      Heart sounds: Normal heart sounds.   Pulmonary:      Effort: Pulmonary effort is normal.   Musculoskeletal:      Cervical back: Normal range of motion.   Neurological:      General: No focal deficit present.      Mental Status: She is alert and oriented to person, place, and time. Mental status is at baseline.         Assessment/Plan:     1. Atopic dermatitis of scalp  - mometasone (ELOCON) 0.1 % lotion; Apply 1 Application topically every day for 7 days.  Dispense: 60 mL; Refill: 0  - methylPREDNISolone (MEDROL DOSEPAK) 4 MG Tablet Therapy Pack; Follow schedule on package instructions.  Dispense: 21 Tablet; Refill: 0  - hydrOXYzine HCl (ATARAX) 25 MG Tab; Take 1 Tablet by mouth 3 times a day as " needed for Itching.  Dispense: 30 Tablet; Refill: 0      MDM/Comments:    Patient's findings appear consistent with atopic dermatitis of the scalp.  No known potential irritant.  States her mother has psoriasis of the scalp.  Patient has never experienced any psoriatic flares or eczema in the past.  We will begin treatment with topical steroid as well as Atarax as prescribed.  Contingent course of oral steroids provided.  Monitor for any potential triggers.    Differential diagnosis, natural history, supportive care, and indications for immediate follow-up discussed. All questions answered. Patient agrees with the plan of care.    Follow-up as needed if symptoms worsen or fail to improve to PCP, Urgent care or Emergency Room.    I have personally reviewed prior external notes and test results pertinent to today's visit.  I have independently reviewed and interpreted all diagnostics ordered during this urgent care acute visit.   Discussed management options (risks,benefits, and alternatives to treatment). Pt expresses understanding and the treatment plan was agreed upon. Questions were encouraged and answered to pt's satisfaction.    Please note that this dictation was created using voice recognition software. I have made a reasonable attempt to correct obvious errors, but I expect that there are errors of grammar and possibly content that I did not discover before finalizing the note.

## 2024-06-20 NOTE — PROGRESS NOTES
EDC      2022    EGA   37w5d    1631: TOCO/US applied, pt educated. Pt presents with c/o decreased fetal movement since last night. Pt states she has not felt any fetal movement throughout the day today. Pt declines LOF, VB, and UC's. Pt declines any complications during this pregnancy. POC discussed with patient, all questions and concerns addressed.     1700: Dr. Belcher updated with patient's status, orders received for a BPP.     1701: Pt educated on POC, questions and concerns addressed.     1727: Ultrasound tech at bedside.     1746: BPP results , Dr. Belcher updated, orders received to discharge patient home.     1800: Discharge instructions gone over with patient, all questions and concerns addressed.    Cellulitis with need for IV antibiotics and possible wound debridement.  CT scan with infection limited to soft tissues/superficial skin.  Pain controlled with ibuprofen Tylenol and Percocet given in the ED.  IV antibiotics started in the ED.  Accepted for admission by Dr. Sanchez, I sent an M pox screening test which should result.  She requested we hold the admission pending and MPOX results.

## 2024-11-26 ENCOUNTER — OFFICE VISIT (OUTPATIENT)
Dept: OBGYN | Facility: CLINIC | Age: 24
End: 2024-11-26
Payer: COMMERCIAL

## 2024-11-26 VITALS
HEIGHT: 71 IN | BODY MASS INDEX: 21.08 KG/M2 | SYSTOLIC BLOOD PRESSURE: 108 MMHG | WEIGHT: 150.6 LBS | DIASTOLIC BLOOD PRESSURE: 87 MMHG

## 2024-11-26 DIAGNOSIS — Z30.09 ENCOUNTER FOR COUNSELING REGARDING CONTRACEPTION: ICD-10-CM

## 2024-11-26 PROBLEM — Z34.80 SUPERVISION OF OTHER NORMAL PREGNANCY, ANTEPARTUM: Status: RESOLVED | Noted: 2021-10-25 | Resolved: 2024-11-26

## 2024-11-26 NOTE — PROGRESS NOTES
GYN PROBLEM VISIT    CC:  Gynecologic Exam (Annual and BC consult )       HPI: Patient is a 24 y.o.  Patient's last menstrual period was 2024 (exact date).  with condoms for contraception who would like to discuss contraception options to help with her periods and for pregnancy prevention.     She started period at 15-17yo. Regular/monthly periods, every 28-30 days. Her periods last 4-5 days with heavy cramping during the first 2 days and heavy bleeding on the first 2 days, requiring changing tampons every 1-2 hours. She has never been on any birth control before but notes that her sister has Mirena IUD and is doing well with it. She is most interested in that but would like to discuss all options. She is no longer breastfeeding.     Patient is due for PAP but defers to next visit if she proceeds with IUD.    Denies any history of uncontrolled HTN or DM, denies history of migraines with aura or blood clots/blood disorders, denies tobacco use.     ROS:   General: denies fever / chills  HEENT: denies sore throat:  CV: denies chest pain:  Repiratory: denies shortness of breath  GI: denies abdominal pain  : denies dysuria:    PFSH:  I personally reviewed the past medical and surgical histories.     Social History     Tobacco Use    Smoking status: Never    Smokeless tobacco: Never   Vaping Use    Vaping status: Never Used   Substance Use Topics    Alcohol use: No    Drug use: Never        Social History     Substance and Sexual Activity   Sexual Activity Yes    Partners: Male    Birth control/protection: None        ALLERGIES / REACTIONS:  No Known Allergies                      Patient Active Problem List    Diagnosis Date Noted    Supervision of other normal pregnancy, antepartum 10/25/2021    JAMES (generalized anxiety disorder) 2019    Panic attacks 2019    Severe episode of recurrent major depressive disorder, without psychotic features (HCC) 2019    Motor tic disorder 2012  "      PHYSICAL EXAMINATION:  Vital Signs:   /87 (BP Location: Right arm, Patient Position: Sitting, BP Cuff Size: Adult)   Ht 5' 11\"   Wt 150 lb 9.6 oz   LMP 2024 (Exact Date)   BMI 21.00 kg/m²     Gen: appears well, NAD  Respiratory: normal effort  Abdomen: Soft, non-tender.  Psych: oriented to time and place.      ASSESSMENT AND PLAN:  24 y.o.      Encounter for counseling regarding contraception  -  The various methods of appropriate contraception available were discussed in detail with the patient including the Mirena IUD, the ParaGard IUD, Nexplanon, oral contraceptive pills, the OrthoEvra Patch, the NuvaRing, and depo-provera.  Reviewed medical history and the patient has no contraindications. Discussed possible SE of any hormonal contraception may include: N/V, HA, menstrual changes, weight fluctuation, breast tenderness, abdominal pain, anxiety or depression, DVT. Counseled that contraception does not protect against STIs and condom use was recommended  - After detailed discussion of the risks and benefits of these methods the patient likely desires proceeding with IUD Mirena.   - Patient aware she is due for PAP, defers today. Plans to do at next visit with IUD insertion.     All questions answered. The patient will call with questions or concerns.      Follow up as needed for Pap and contraception.     Kaylin Rodas P.A.-C.      "

## 2024-11-26 NOTE — PROGRESS NOTES
Pt presents for GYN visit  Last seen on: 2/11/232 Scotty   Phone: [phone]   Pharmacy verified   LMP: 11/22/24  Sexually active: yes  BCM: none  Last PAP: 9/28/2021 negative - does not desire to repeat   Pt states does not know anything about BC, sister has IUD and she likes it.

## 2024-12-09 ENCOUNTER — HOSPITAL ENCOUNTER (OUTPATIENT)
Facility: MEDICAL CENTER | Age: 24
End: 2024-12-09
Attending: STUDENT IN AN ORGANIZED HEALTH CARE EDUCATION/TRAINING PROGRAM
Payer: COMMERCIAL

## 2024-12-09 ENCOUNTER — GYNECOLOGY VISIT (OUTPATIENT)
Dept: OBGYN | Facility: CLINIC | Age: 24
End: 2024-12-09
Payer: COMMERCIAL

## 2024-12-09 VITALS
HEART RATE: 86 BPM | BODY MASS INDEX: 20.81 KG/M2 | SYSTOLIC BLOOD PRESSURE: 97 MMHG | WEIGHT: 149.2 LBS | DIASTOLIC BLOOD PRESSURE: 60 MMHG

## 2024-12-09 DIAGNOSIS — Z30.430 ENCOUNTER FOR IUD INSERTION: ICD-10-CM

## 2024-12-09 DIAGNOSIS — Z01.419 ENCOUNTER FOR GYNECOLOGICAL EXAMINATION (GENERAL) (ROUTINE) WITHOUT ABNORMAL FINDINGS: ICD-10-CM

## 2024-12-09 DIAGNOSIS — Z30.019 ENCOUNTER FOR FEMALE BIRTH CONTROL: ICD-10-CM

## 2024-12-09 LAB
POCT INT CON NEG: NEGATIVE
POCT INT CON POS: POSITIVE
POCT URINE PREGNANCY TEST: NEGATIVE

## 2024-12-09 PROCEDURE — 99395 PREV VISIT EST AGE 18-39: CPT | Performed by: STUDENT IN AN ORGANIZED HEALTH CARE EDUCATION/TRAINING PROGRAM

## 2024-12-09 PROCEDURE — 81025 URINE PREGNANCY TEST: CPT | Performed by: STUDENT IN AN ORGANIZED HEALTH CARE EDUCATION/TRAINING PROGRAM

## 2024-12-09 PROCEDURE — 88142 CYTOPATH C/V THIN LAYER: CPT

## 2024-12-09 PROCEDURE — 58300 INSERT INTRAUTERINE DEVICE: CPT | Performed by: STUDENT IN AN ORGANIZED HEALTH CARE EDUCATION/TRAINING PROGRAM

## 2024-12-09 PROCEDURE — 99459 PELVIC EXAMINATION: CPT | Performed by: STUDENT IN AN ORGANIZED HEALTH CARE EDUCATION/TRAINING PROGRAM

## 2024-12-09 NOTE — PROGRESS NOTES
ANNUAL GYNECOLOGY VISIT    Chief Complaint  Annual Exam    Subjective  Vanessa Chávez is a 24 y.o. female  Patient's last menstrual period was 2024 (exact date). On rhythm for contraception who presents today for Annual Exam/IUD Mirena insertion.     Preventive Care   Immunization History   Administered Date(s) Administered    9VHPV VACCINE 2-3 DOSE IM (GARDASIL 9) 2017    Dtap Vaccine 1998, 1999, 1999, 1999, 2003    HPV Quadrivalent Vaccine (GARDASIL) - HISTORICAL DATA 2014, 2014    Hepatitis A Vaccine, Adult 2003    Hepatitis A Vaccine, Ped/Adol 2004    Hepatitis B Vaccine Adolescent/Pediatric 1998, 1999, 1999    Hib Vaccine (Prp-d) - HISTORICAL DATA 1998, 1999, 1999, 1999    IPV 1999, 2003    Influenza Vaccine Quad Inj (Pf) 2017, 10/15/2018, 2019, 2021    Influenza Vaccine Quad Inj (Preserved) 2014    MENING VAC SERO B 2-3 DOSE SCHED IM (TRUMENBA) 2019    MMR Vaccine 1999, 2003    Meningococcal, unknown serogroups 2014    OPV TRIVALENT - HISTORICAL DATA (GIVEN PRIOR TO MAY 2016) 1998, 1999    Tdap Vaccine 2010, 2017, 2018, 2022    Tuberculin Skin Test 2017, 2017, 2021, 2021    Varicella Vaccine Live 1999       HPV vaccine: Had 3 doses.   Last Pap: 21. NILM.  History of abnormal pap: no  Intimate partner violence (patient interviewed when partner was not in the room): Negative      Gynecology History  Current Sexual Activity: yes - 1  Partners: Male  History of sexually transmitted diseases? no  Abnormal vaginal discharge? no  Significant pelvic pain: No  Urinary incontinence: no    Menstrual History  Patient's last menstrual period was 2024 (exact date).  Menses began at 15-17yo. Regular/monthly periods, every 28-30 days. Her periods last 4-5 days with heavy  cramping during the first 2 days and heavy bleeding on the first 2 days, requiring changing tampons every 1-2 hours. She has never been on any birth control before but desires IUD Mirena insertion today.   Cyclic symptoms include breast tenderness and moodiness. No intermenstrual bleeding, spotting, or discharge.    Contraception  Current: rhythm  Past: none      Cancer Risk Assessement:  Family history of:   - Breast cancer: no   - Ovarian cancer: no   - Uterine cancer: no   - Colon cancer: no    Obstetric History  OB History    Para Term  AB Living   3 2 2   1 2   SAB IAB Ectopic Molar Multiple Live Births   1       0 2      # Outcome Date GA Lbr Nakul/2nd Weight Sex Type Anes PTL Lv   3 Term 20 40w4d  8 lb 6.8 oz M Vag-Spont EPI N SHERIDAN   2 Term 19 41w0d / 02:19 8 lb 11.3 oz M Vag-Spont EPI N SHERIDAN   1 SAB 10/01/17     SAB          Past Medical History  Past Medical History:   Diagnosis Date    Tourette's disorder 2012       Past Surgical History  Past Surgical History:   Procedure Laterality Date    DENTAL EXTRACTION(S)         Social History  Social History     Tobacco Use    Smoking status: Never    Smokeless tobacco: Never   Vaping Use    Vaping status: Never Used   Substance Use Topics    Alcohol use: No    Drug use: Never        Family History  Family History   Problem Relation Age of Onset    Anemia Mother     No Known Problems Father     Psychiatric Illness Brother         tourette movement d/o - resolved    Diabetes Maternal Uncle         T2DM    Arthritis Maternal Grandmother     Diabetes Maternal Grandmother     No Known Problems Maternal Grandfather     No Known Problems Paternal Grandmother     No Known Problems Paternal Grandfather     No Known Problems Son        Home Medications  Current Outpatient Medications   Medication Sig    methylPREDNISolone (MEDROL DOSEPAK) 4 MG Tablet Therapy Pack Follow schedule on package instructions.    hydrOXYzine HCl (ATARAX) 25 MG Tab Take  "1 Tablet by mouth 3 times a day as needed for Itching.    acetaminophen (TYLENOL) 500 MG Tab Take 2 Tablets by mouth every 6 hours as needed. (Patient not taking: Reported on 7/11/2023)    ibuprofen (MOTRIN) 800 MG Tab Take 1 Tablet by mouth every 8 hours as needed. (Patient not taking: Reported on 7/11/2023)    Prenatal MV-Min-Fe Fum-FA-DHA (PRENATAL 1 PO) Take  by mouth. (Patient not taking: Reported on 7/11/2023)       Allergies/Reactions  No Known Allergies    ROS  Review of Systems:  Gen: no fevers or chills, no significant weight loss or gain  Respiratory:  no cough or dyspnea  Cardiac:  no chest pain, no palpitations, no syncope  Breast: no breast discharge, pain, lump or skin changes  GI:  no heartburn, no abdominal pain, no nausea or vomiting  Psych: no depression or anxiety  Neuro: no migraines with aura, fainting spells, numbness or tingling    Objective  LMP 11/22/2024 (Exact Date)     Constitutional: The patient is well developed and well nourished.  Psychiatric: Patient is oriented to time place and person.   Skin: No rash observed.  Neck: Appears symmetric. Thyroid normal size  Respiratory: normal effort  Breast: Inspection reveals no asymetry or nipple discharge, no skin thickening, dimpling or erythema.  Palpation demonstrates no masses.  Abdomen: Soft, non-tender.  Pelvic Exam:      Vulva: external female genitalia are normal in appearance. No lesions     Urethra - no lesions, no erythema     Vagina: moist, pink, normal ruggae     Cervix: pink, smooth, no lesions, no CMT     Uterus - non-tender, normal size, shape, contour, mobile, anteverted     Ovaries: non-tender, no appreciable masses   Pap Smear performed: Yes   Chaperone Present: Juvencio Avalos MA  Extremities: Legs are symmetric and without tenderness. There is no edema present.    Labs/Imaging:  No results found for: \"HDL\", \"LDL\"  No components found for: \"A1C1\"  WBC (K/uL)   Date Value   02/14/2022 12.6 (H)     Lab Results   Component " Value Date/Time    CO2 22 02/03/2019 2115    BUN 14 02/03/2019 2115         Patient Active Problem List    Diagnosis Date Noted    JAMES (generalized anxiety disorder) 12/20/2019    Panic attacks 12/20/2019    Severe episode of recurrent major depressive disorder, without psychotic features (HCC) 12/20/2019    Motor tic disorder 08/24/2012       Assessment & Plan  Vanessa Chávez is a 24 y.o. female who presents today for Annual Gyn Exam.   Assessment & Plan  Encounter for gynecological examination (general) (routine) without abnormal findings   Cervical cancer screening:       Pap: Collected today  - STI Screen (HIV, Syphilis, Chlamydia / Gonorrhea): declined-chlamydia and gonorrhea  - MAMMOGRAM: Not indicated   - COLONOSCOPY: Not indicated  - IMMUNIZATIONS: Recommended Flu and vaccine each season and COVID boosters when indicated.  - COUNSELING: breast self exam, STD prevention, and family planning choices  Orders:    THINPREP PAP, REFLEX HPV ON ASC-US AND ABOVE; Future    Encounter for IUD insertion  - IUD Mirena inserted today. See procedure note. Patient tolerated well.   - Informed consent is signed.  UPT negative.  - Follow up in 4-6 weeks for IUD string check  Orders:    levonorgestrel (Mirena) 20 MCG/DAY IUD 1 Each    Encounter for female birth control  - IUD Mirena inserted today.  - In depth contraception counseling done at last visit.   Orders:    POCT Pregnancy    Consent for all Surgical, Special Diagnostic or Therapeutic Procedures             Return: Annually or PRN    Kaylin Rodas P.A.-C.  [facility]   12/9/2024

## 2024-12-09 NOTE — PROCEDURES
IUD INSERTION PROCEDURE NOTE    Vanessa Chávez  is here for IUD insertion.     Today the patient is counseled on procedure of IUD insertion. I discussed with the patient the risks of IUD including infection, risk of IUD expulsion, the risk of uterine perforation (1-1000, slightly higher while breastfeeding, pt currently not breastfeeding), risk of IUD migration or lost strings.  If the IUD does migrate, the patient may require a separate procedure such as hysteroscopy or laparoscopy to remove or retrieve the migrated IUD. I also discussed the 0.1% risk of pregnancy with IUD use which coincides with an increased risk of ectopic pregnancy with IUD use.  We reviewed leaving the strings long enough to prevent disappearance however this may initially result in the possibility that partner can feel the IUD during intercourse; this typically resolves as the strings soften and tuck behind cervix. I also discussed the side effects of progestin-containing IUDs including decreased, irregular or absent menses and/or spotting.  All questions answered.  Informed consent is signed.  UPT negative. Patient requests for paracervical block.      BP 97/60 (BP Location: Right arm, Patient Position: Sitting, BP Cuff Size: Adult)   Pulse 86   Wt 149 lb 3.2 oz   LMP 2024 (Exact Date)   BMI 20.81 kg/m²     Procedure  The pelvis was examined and the uterus is anteverted.  The speculum was placed.  Beta-dine was applied to the cervix. 4ml of 1% lidocaine mixed with 4 ml of sterile water was injected at 2 oclock and 10 oclock position of cervix.   Tenaculum was used used to grasp the cervix and provide counter traction.  The uterus was sounded with the IUD device to 7cm. The device was withdrawn 1.5cm and the IUD was then deployed and gently advanced to the fundus without complications then the device removed.  Type of IUD placed: Mirena    Aftercare discussed. She is to return for fever, worsening pelvic pain,  abdominal pain, syncope, unusually heavy vaginal bleeding, suspected expulsion, foul smelling vaginal discharge, or pregnancy-like symptoms. Do not recommend using menstrual cups or discs as it may potentially increase chances of IUD becoming dislodged.     If the patient is comfortable she may also perform a digital self examination to check for the strings.  Return to the office in 4-6 weeks for string check or any concerns with IUD symptoms/placement/possible expulsion.      Kaylin Rodas P.A.-C.

## 2024-12-09 NOTE — PROGRESS NOTES
Pt presents for GYN visit -IUD insertion -   Last seen on: 11/26/24 Kaylin   Phone: [phone]   Pharmacy verified   LMP: 11/22/24  Sexually active: yes, had unprotected sex twp weeks ago.   BCM: none   Last PAP: 9/28/21 negative  Pt states does not wish to repeat pap smear today.

## 2024-12-12 LAB — THINPREP PAP, CYTOLOGY NL11781: NORMAL

## 2024-12-16 ENCOUNTER — TELEPHONE (OUTPATIENT)
Dept: OBGYN | Facility: CLINIC | Age: 24
End: 2024-12-16
Payer: COMMERCIAL

## 2024-12-16 NOTE — TELEPHONE ENCOUNTER
Called patient to let her know that her pap smear did come back NILM and she will need a repeat in 3 years. Pt verbalized understanding.